# Patient Record
Sex: FEMALE | Race: WHITE | NOT HISPANIC OR LATINO | URBAN - METROPOLITAN AREA
[De-identification: names, ages, dates, MRNs, and addresses within clinical notes are randomized per-mention and may not be internally consistent; named-entity substitution may affect disease eponyms.]

---

## 2021-01-02 ENCOUNTER — ANESTHESIA (INPATIENT)
Dept: OPERATING ROOM | Facility: HOSPITAL | Age: 76
DRG: 418 | End: 2021-01-02
Payer: MEDICARE

## 2021-01-02 ENCOUNTER — HOSPITAL ENCOUNTER (INPATIENT)
Facility: HOSPITAL | Age: 76
LOS: 1 days | Discharge: HOME | DRG: 418 | End: 2021-01-03
Attending: STUDENT IN AN ORGANIZED HEALTH CARE EDUCATION/TRAINING PROGRAM | Admitting: SURGERY
Payer: MEDICARE

## 2021-01-02 ENCOUNTER — ANESTHESIA EVENT (INPATIENT)
Dept: OPERATING ROOM | Facility: HOSPITAL | Age: 76
DRG: 418 | End: 2021-01-02
Payer: MEDICARE

## 2021-01-02 ENCOUNTER — APPOINTMENT (EMERGENCY)
Dept: RADIOLOGY | Facility: HOSPITAL | Age: 76
DRG: 418 | End: 2021-01-02
Attending: STUDENT IN AN ORGANIZED HEALTH CARE EDUCATION/TRAINING PROGRAM
Payer: MEDICARE

## 2021-01-02 ENCOUNTER — APPOINTMENT (EMERGENCY)
Dept: RADIOLOGY | Facility: HOSPITAL | Age: 76
DRG: 418 | End: 2021-01-02
Attending: SURGERY
Payer: MEDICARE

## 2021-01-02 DIAGNOSIS — K81.0 ACUTE CHOLECYSTITIS: Primary | ICD-10-CM

## 2021-01-02 DIAGNOSIS — E87.1 HYPONATREMIA: ICD-10-CM

## 2021-01-02 LAB
ABO + RH BLD: NORMAL
ALBUMIN SERPL-MCNC: 4.5 G/DL (ref 3.4–5)
ALP SERPL-CCNC: 87 IU/L (ref 35–126)
ALT SERPL-CCNC: 25 IU/L (ref 11–54)
ANION GAP SERPL CALC-SCNC: 11 MEQ/L (ref 3–15)
APTT PPP: <22 SEC (ref 23–35)
AST SERPL-CCNC: 40 IU/L (ref 15–41)
ATRIAL RATE: 78
BACTERIA URNS QL MICRO: NORMAL /HPF
BASOPHILS # BLD: 0.04 K/UL (ref 0.01–0.1)
BASOPHILS NFR BLD: 0.3 %
BILIRUB DIRECT SERPL-MCNC: 0.2 MG/DL
BILIRUB SERPL-MCNC: 1 MG/DL (ref 0.3–1.2)
BILIRUB UR QL STRIP.AUTO: NEGATIVE MG/DL
BLD GP AB SCN SERPL QL: NEGATIVE
BUN SERPL-MCNC: 15 MG/DL (ref 8–20)
CALCIUM SERPL-MCNC: 9 MG/DL (ref 8.9–10.3)
CHLORIDE SERPL-SCNC: 92 MEQ/L (ref 98–109)
CLARITY UR REFRACT.AUTO: ABNORMAL
CO2 SERPL-SCNC: 23 MEQ/L (ref 22–32)
COLOR UR AUTO: YELLOW
CREAT SERPL-MCNC: 0.7 MG/DL (ref 0.6–1.1)
D AG BLD QL: POSITIVE
DIFFERENTIAL METHOD BLD: ABNORMAL
EOSINOPHIL # BLD: 0 K/UL (ref 0.04–0.36)
EOSINOPHIL NFR BLD: 0 %
ERYTHROCYTE [DISTWIDTH] IN BLOOD BY AUTOMATED COUNT: 12.2 % (ref 11.7–14.4)
GFR SERPL CREATININE-BSD FRML MDRD: >60 ML/MIN/1.73M*2
GLUCOSE SERPL-MCNC: 154 MG/DL (ref 70–99)
GLUCOSE UR STRIP.AUTO-MCNC: NEGATIVE MG/DL
HCT VFR BLDCO AUTO: 32.6 % (ref 35–45)
HGB BLD-MCNC: 10.7 G/DL (ref 11.8–15.7)
HGB UR QL STRIP.AUTO: NEGATIVE
HYALINE CASTS #/AREA URNS LPF: NORMAL /LPF
IMM GRANULOCYTES # BLD AUTO: 0.09 K/UL (ref 0–0.08)
IMM GRANULOCYTES NFR BLD AUTO: 0.6 %
INR PPP: 1.1 INR
KETONES UR STRIP.AUTO-MCNC: NEGATIVE MG/DL
LABORATORY COMMENT REPORT: NORMAL
LEUKOCYTE ESTERASE UR QL STRIP.AUTO: NEGATIVE
LIPASE SERPL-CCNC: 32 U/L (ref 20–51)
LYMPHOCYTES # BLD: 0.43 K/UL (ref 1.2–3.5)
LYMPHOCYTES NFR BLD: 2.8 %
MCH RBC QN AUTO: 30.9 PG (ref 28–33.2)
MCHC RBC AUTO-ENTMCNC: 32.8 G/DL (ref 32.2–35.5)
MCV RBC AUTO: 94.2 FL (ref 83–98)
MONOCYTES # BLD: 0.81 K/UL (ref 0.28–0.8)
MONOCYTES NFR BLD: 5.3 %
NEUTROPHILS # BLD: 14.01 K/UL (ref 1.7–7)
NEUTS SEG NFR BLD: 91 %
NITRITE UR QL STRIP.AUTO: NEGATIVE
NRBC BLD-RTO: 0 %
P AXIS: 94
PDW BLD AUTO: 9.1 FL (ref 9.4–12.3)
PH UR STRIP.AUTO: 6.5 [PH]
PLATELET # BLD AUTO: 287 K/UL (ref 150–369)
POTASSIUM SERPL-SCNC: 3.4 MEQ/L (ref 3.6–5.1)
PR INTERVAL: 168
PROT SERPL-MCNC: 7.3 G/DL (ref 6–8.2)
PROT UR QL STRIP.AUTO: NEGATIVE
PROTHROMBIN TIME: 13.8 SEC (ref 12.2–14.5)
QRS DURATION: 82
QT INTERVAL: 422
QTC CALCULATION(BAZETT): 481
R AXIS: 76
RBC # BLD AUTO: 3.46 M/UL (ref 3.93–5.22)
RBC #/AREA URNS HPF: NORMAL /HPF
SARS-COV-2 RNA RESP QL NAA+PROBE: NEGATIVE
SODIUM SERPL-SCNC: 126 MEQ/L (ref 136–144)
SP GR UR REFRACT.AUTO: 1.01
SQUAMOUS URNS QL MICRO: NORMAL /HPF
T WAVE AXIS: 75
TROPONIN I SERPL-MCNC: <0.02 NG/ML
UROBILINOGEN UR STRIP-ACNC: 0.2 EU/DL
VENTRICULAR RATE: 78
WBC # BLD AUTO: 15.38 K/UL (ref 3.8–10.5)
WBC #/AREA URNS HPF: NORMAL /HPF

## 2021-01-02 PROCEDURE — 87205 SMEAR GRAM STAIN: CPT | Performed by: SURGERY

## 2021-01-02 PROCEDURE — 0FT44ZZ RESECTION OF GALLBLADDER, PERCUTANEOUS ENDOSCOPIC APPROACH: ICD-10-PCS | Performed by: SURGERY

## 2021-01-02 PROCEDURE — 71045 X-RAY EXAM CHEST 1 VIEW: CPT

## 2021-01-02 PROCEDURE — 99285 EMERGENCY DEPT VISIT HI MDM: CPT | Mod: 25

## 2021-01-02 PROCEDURE — U0002 COVID-19 LAB TEST NON-CDC: HCPCS | Performed by: PHYSICIAN ASSISTANT

## 2021-01-02 PROCEDURE — 85730 THROMBOPLASTIN TIME PARTIAL: CPT | Performed by: STUDENT IN AN ORGANIZED HEALTH CARE EDUCATION/TRAINING PROGRAM

## 2021-01-02 PROCEDURE — 63600000 HC DRUGS/DETAIL CODE: Performed by: ANESTHESIOLOGY

## 2021-01-02 PROCEDURE — BF10YZZ FLUOROSCOPY OF BILE DUCTS USING OTHER CONTRAST: ICD-10-PCS | Performed by: SURGERY

## 2021-01-02 PROCEDURE — 37000001 HC ANESTHESIA GENERAL: Performed by: SURGERY

## 2021-01-02 PROCEDURE — 96365 THER/PROPH/DIAG IV INF INIT: CPT | Mod: 59

## 2021-01-02 PROCEDURE — 93005 ELECTROCARDIOGRAM TRACING: CPT | Performed by: PHYSICIAN ASSISTANT

## 2021-01-02 PROCEDURE — 25000000 HC PHARMACY GENERAL: Performed by: NURSE ANESTHETIST, CERTIFIED REGISTERED

## 2021-01-02 PROCEDURE — 36000004 HC OR LEVEL 4 INITIAL 30MIN: Performed by: SURGERY

## 2021-01-02 PROCEDURE — 80053 COMPREHEN METABOLIC PANEL: CPT | Performed by: PHYSICIAN ASSISTANT

## 2021-01-02 PROCEDURE — 63600000 HC DRUGS/DETAIL CODE: Performed by: NURSE ANESTHETIST, CERTIFIED REGISTERED

## 2021-01-02 PROCEDURE — 25800000 HC PHARMACY IV SOLUTIONS: Performed by: PHYSICIAN ASSISTANT

## 2021-01-02 PROCEDURE — 99999 PR OFFICE/OUTPT VISIT,PROCEDURE ONLY: CPT | Performed by: SURGERY

## 2021-01-02 PROCEDURE — 86900 BLOOD TYPING SEROLOGIC ABO: CPT

## 2021-01-02 PROCEDURE — 47563 LAPARO CHOLECYSTECTOMY/GRAPH: CPT | Performed by: SURGERY

## 2021-01-02 PROCEDURE — 36000014 HC OR LEVEL 4 EA ADDL MIN: Performed by: SURGERY

## 2021-01-02 PROCEDURE — 63600000 HC DRUGS/DETAIL CODE

## 2021-01-02 PROCEDURE — 27200000 HC STERILE SUPPLY: Performed by: SURGERY

## 2021-01-02 PROCEDURE — 25000000 HC PHARMACY GENERAL: Performed by: SURGERY

## 2021-01-02 PROCEDURE — 200200 PR NO CHARGE: Performed by: SURGERY

## 2021-01-02 PROCEDURE — 63700000 HC SELF-ADMINISTRABLE DRUG: Performed by: STUDENT IN AN ORGANIZED HEALTH CARE EDUCATION/TRAINING PROGRAM

## 2021-01-02 PROCEDURE — 25000000 HC PHARMACY GENERAL: Performed by: STUDENT IN AN ORGANIZED HEALTH CARE EDUCATION/TRAINING PROGRAM

## 2021-01-02 PROCEDURE — 27800000 HC SUPPLY/IMPLANTS: Performed by: SURGERY

## 2021-01-02 PROCEDURE — 76705 ECHO EXAM OF ABDOMEN: CPT

## 2021-01-02 PROCEDURE — 99221 1ST HOSP IP/OBS SF/LOW 40: CPT | Mod: 57 | Performed by: SURGERY

## 2021-01-02 PROCEDURE — 81001 URINALYSIS AUTO W/SCOPE: CPT | Performed by: PHYSICIAN ASSISTANT

## 2021-01-02 PROCEDURE — 82248 BILIRUBIN DIRECT: CPT | Performed by: PHYSICIAN ASSISTANT

## 2021-01-02 PROCEDURE — 71000011 HC PACU PHASE 1 EA ADDL MIN: Performed by: SURGERY

## 2021-01-02 PROCEDURE — 36415 COLL VENOUS BLD VENIPUNCTURE: CPT | Performed by: PHYSICIAN ASSISTANT

## 2021-01-02 PROCEDURE — 25800000 HC PHARMACY IV SOLUTIONS: Performed by: STUDENT IN AN ORGANIZED HEALTH CARE EDUCATION/TRAINING PROGRAM

## 2021-01-02 PROCEDURE — 85610 PROTHROMBIN TIME: CPT | Performed by: STUDENT IN AN ORGANIZED HEALTH CARE EDUCATION/TRAINING PROGRAM

## 2021-01-02 PROCEDURE — 83690 ASSAY OF LIPASE: CPT | Performed by: PHYSICIAN ASSISTANT

## 2021-01-02 PROCEDURE — 74300 X-RAY BILE DUCTS/PANCREAS: CPT

## 2021-01-02 PROCEDURE — 63600000 HC DRUGS/DETAIL CODE: Performed by: STUDENT IN AN ORGANIZED HEALTH CARE EDUCATION/TRAINING PROGRAM

## 2021-01-02 PROCEDURE — 85025 COMPLETE CBC W/AUTO DIFF WBC: CPT | Performed by: PHYSICIAN ASSISTANT

## 2021-01-02 PROCEDURE — 84484 ASSAY OF TROPONIN QUANT: CPT | Performed by: PHYSICIAN ASSISTANT

## 2021-01-02 PROCEDURE — 71000001 HC PACU PHASE 1 INITIAL 30MIN: Performed by: SURGERY

## 2021-01-02 PROCEDURE — 88304 TISSUE EXAM BY PATHOLOGIST: CPT | Performed by: SURGERY

## 2021-01-02 PROCEDURE — 12000000 HC ROOM AND CARE MED/SURG

## 2021-01-02 RX ORDER — CEFAZOLIN SODIUM 1 G/50ML
1 SOLUTION INTRAVENOUS
Status: COMPLETED | OUTPATIENT
Start: 2021-01-02 | End: 2021-01-02

## 2021-01-02 RX ORDER — LEVOTHYROXINE SODIUM 100 UG/1
100 TABLET ORAL EVERY OTHER DAY
Status: DISCONTINUED | OUTPATIENT
Start: 2021-01-02 | End: 2021-01-02

## 2021-01-02 RX ORDER — METRONIDAZOLE 500 MG/100ML
500 INJECTION, SOLUTION INTRAVENOUS ONCE AS NEEDED
Status: COMPLETED | OUTPATIENT
Start: 2021-01-02 | End: 2021-01-02

## 2021-01-02 RX ORDER — ROCURONIUM BROMIDE 10 MG/ML
INJECTION, SOLUTION INTRAVENOUS AS NEEDED
Status: DISCONTINUED | OUTPATIENT
Start: 2021-01-02 | End: 2021-01-02 | Stop reason: SURG

## 2021-01-02 RX ORDER — SODIUM CHLORIDE 9 MG/ML
INJECTION, SOLUTION INTRAVENOUS CONTINUOUS
Status: DISCONTINUED | OUTPATIENT
Start: 2021-01-02 | End: 2021-01-02 | Stop reason: HOSPADM

## 2021-01-02 RX ORDER — TRAMADOL HYDROCHLORIDE 50 MG/1
50 TABLET ORAL EVERY 4 HOURS PRN
COMMUNITY
Start: 2020-12-12

## 2021-01-02 RX ORDER — LABETALOL HCL 20 MG/4 ML
5 SYRINGE (ML) INTRAVENOUS AS NEEDED
Status: DISCONTINUED | OUTPATIENT
Start: 2021-01-02 | End: 2021-01-02

## 2021-01-02 RX ORDER — HYDROMORPHONE HYDROCHLORIDE 1 MG/ML
0.5 INJECTION, SOLUTION INTRAMUSCULAR; INTRAVENOUS; SUBCUTANEOUS
Status: DISCONTINUED | OUTPATIENT
Start: 2021-01-02 | End: 2021-01-02

## 2021-01-02 RX ORDER — FENTANYL CITRATE 50 UG/ML
50 INJECTION, SOLUTION INTRAMUSCULAR; INTRAVENOUS
Status: DISCONTINUED | OUTPATIENT
Start: 2021-01-02 | End: 2021-01-02

## 2021-01-02 RX ORDER — ONDANSETRON HYDROCHLORIDE 2 MG/ML
4 INJECTION, SOLUTION INTRAVENOUS
Status: DISCONTINUED | OUTPATIENT
Start: 2021-01-02 | End: 2021-01-02

## 2021-01-02 RX ORDER — KETOROLAC TROMETHAMINE 15 MG/ML
15 INJECTION, SOLUTION INTRAMUSCULAR; INTRAVENOUS ONCE
Status: COMPLETED | OUTPATIENT
Start: 2021-01-02 | End: 2021-01-02

## 2021-01-02 RX ORDER — DOCUSATE SODIUM 100 MG/1
100 CAPSULE, LIQUID FILLED ORAL DAILY
COMMUNITY

## 2021-01-02 RX ORDER — DOCUSATE SODIUM 100 MG/1
100 CAPSULE, LIQUID FILLED ORAL DAILY
Status: DISCONTINUED | OUTPATIENT
Start: 2021-01-02 | End: 2021-01-03 | Stop reason: HOSPADM

## 2021-01-02 RX ORDER — BUPIVACAINE HYDROCHLORIDE 2.5 MG/ML
INJECTION, SOLUTION EPIDURAL; INFILTRATION; INTRACAUDAL AS NEEDED
Status: DISCONTINUED | OUTPATIENT
Start: 2021-01-02 | End: 2021-01-02 | Stop reason: HOSPADM

## 2021-01-02 RX ORDER — IBUPROFEN 200 MG
16-32 TABLET ORAL AS NEEDED
Status: DISCONTINUED | OUTPATIENT
Start: 2021-01-02 | End: 2021-01-03 | Stop reason: HOSPADM

## 2021-01-02 RX ORDER — DEXAMETHASONE SODIUM PHOSPHATE 4 MG/ML
INJECTION, SOLUTION INTRA-ARTICULAR; INTRALESIONAL; INTRAMUSCULAR; INTRAVENOUS; SOFT TISSUE AS NEEDED
Status: DISCONTINUED | OUTPATIENT
Start: 2021-01-02 | End: 2021-01-02 | Stop reason: SURG

## 2021-01-02 RX ORDER — METRONIDAZOLE 500 MG/100ML
INJECTION, SOLUTION INTRAVENOUS
Status: COMPLETED
Start: 2021-01-02 | End: 2021-01-02

## 2021-01-02 RX ORDER — MORPHINE SULFATE 15 MG/1
7.5 TABLET ORAL EVERY 4 HOURS PRN
Status: DISCONTINUED | OUTPATIENT
Start: 2021-01-02 | End: 2021-01-02 | Stop reason: HOSPADM

## 2021-01-02 RX ORDER — TRAMADOL HYDROCHLORIDE 50 MG/1
50 TABLET ORAL EVERY 4 HOURS PRN
Status: DISCONTINUED | OUTPATIENT
Start: 2021-01-02 | End: 2021-01-03 | Stop reason: HOSPADM

## 2021-01-02 RX ORDER — LEVOTHYROXINE SODIUM 112 UG/1
112 TABLET ORAL EVERY OTHER DAY
Status: DISCONTINUED | OUTPATIENT
Start: 2020-12-28 | End: 2021-01-02

## 2021-01-02 RX ORDER — ONDANSETRON HYDROCHLORIDE 2 MG/ML
4 INJECTION, SOLUTION INTRAVENOUS ONCE
Status: DISPENSED | OUTPATIENT
Start: 2021-01-02 | End: 2021-01-02

## 2021-01-02 RX ORDER — ONDANSETRON HYDROCHLORIDE 2 MG/ML
INJECTION, SOLUTION INTRAVENOUS AS NEEDED
Status: DISCONTINUED | OUTPATIENT
Start: 2021-01-02 | End: 2021-01-02 | Stop reason: SURG

## 2021-01-02 RX ORDER — HEPARIN SODIUM 5000 [USP'U]/ML
5000 INJECTION, SOLUTION INTRAVENOUS; SUBCUTANEOUS EVERY 8 HOURS
Status: DISCONTINUED | OUTPATIENT
Start: 2021-01-03 | End: 2021-01-02 | Stop reason: HOSPADM

## 2021-01-02 RX ORDER — KETOROLAC TROMETHAMINE 15 MG/ML
15 INJECTION, SOLUTION INTRAMUSCULAR; INTRAVENOUS EVERY 6 HOURS PRN
Status: DISCONTINUED | OUTPATIENT
Start: 2021-01-02 | End: 2021-01-03 | Stop reason: HOSPADM

## 2021-01-02 RX ORDER — LIDOCAINE HYDROCHLORIDE 10 MG/ML
INJECTION, SOLUTION INFILTRATION; PERINEURAL AS NEEDED
Status: DISCONTINUED | OUTPATIENT
Start: 2021-01-02 | End: 2021-01-02 | Stop reason: SURG

## 2021-01-02 RX ORDER — LEVOTHYROXINE SODIUM 112 UG/1
112 TABLET ORAL EVERY OTHER DAY
Status: DISCONTINUED | OUTPATIENT
Start: 2021-01-02 | End: 2021-01-02

## 2021-01-02 RX ORDER — DEXTROSE 50 % IN WATER (D50W) INTRAVENOUS SYRINGE
25 AS NEEDED
Status: DISCONTINUED | OUTPATIENT
Start: 2021-01-02 | End: 2021-01-03 | Stop reason: HOSPADM

## 2021-01-02 RX ORDER — DEXTROSE 40 %
15-30 GEL (GRAM) ORAL AS NEEDED
Status: DISCONTINUED | OUTPATIENT
Start: 2021-01-02 | End: 2021-01-03 | Stop reason: HOSPADM

## 2021-01-02 RX ORDER — LEVOTHYROXINE SODIUM 100 UG/1
100 TABLET ORAL EVERY OTHER DAY
Status: DISCONTINUED | OUTPATIENT
Start: 2021-01-03 | End: 2021-01-03 | Stop reason: HOSPADM

## 2021-01-02 RX ORDER — KETOROLAC TROMETHAMINE 15 MG/ML
INJECTION, SOLUTION INTRAMUSCULAR; INTRAVENOUS
Status: COMPLETED
Start: 2021-01-02 | End: 2021-01-02

## 2021-01-02 RX ORDER — LEVOTHYROXINE SODIUM 100 UG/1
100 TABLET ORAL EVERY OTHER DAY
Status: DISCONTINUED | OUTPATIENT
Start: 2021-01-04 | End: 2021-01-02

## 2021-01-02 RX ORDER — LEVOTHYROXINE SODIUM 100 UG/1
100 TABLET ORAL EVERY OTHER DAY
COMMUNITY
Start: 2020-11-24

## 2021-01-02 RX ORDER — LEVOTHYROXINE SODIUM 112 UG/1
112 TABLET ORAL EVERY OTHER DAY
Status: DISCONTINUED | OUTPATIENT
Start: 2021-01-04 | End: 2021-01-03 | Stop reason: HOSPADM

## 2021-01-02 RX ORDER — ACETAMINOPHEN 325 MG/1
650 TABLET ORAL EVERY 6 HOURS PRN
Status: DISCONTINUED | OUTPATIENT
Start: 2021-01-02 | End: 2021-01-03 | Stop reason: HOSPADM

## 2021-01-02 RX ORDER — TRAMADOL HYDROCHLORIDE 50 MG/1
25 TABLET ORAL EVERY 4 HOURS PRN
Status: DISCONTINUED | OUTPATIENT
Start: 2021-01-02 | End: 2021-01-02

## 2021-01-02 RX ORDER — HYDROMORPHONE HYDROCHLORIDE 1 MG/ML
0.5 INJECTION, SOLUTION INTRAMUSCULAR; INTRAVENOUS; SUBCUTANEOUS ONCE
Status: DISCONTINUED | OUTPATIENT
Start: 2021-01-02 | End: 2021-01-02

## 2021-01-02 RX ORDER — KETOROLAC TROMETHAMINE 30 MG/ML
INJECTION, SOLUTION INTRAMUSCULAR; INTRAVENOUS AS NEEDED
Status: DISCONTINUED | OUTPATIENT
Start: 2021-01-02 | End: 2021-01-02 | Stop reason: SURG

## 2021-01-02 RX ORDER — PROPOFOL 10 MG/ML
INJECTION, EMULSION INTRAVENOUS AS NEEDED
Status: DISCONTINUED | OUTPATIENT
Start: 2021-01-02 | End: 2021-01-02 | Stop reason: SURG

## 2021-01-02 RX ORDER — TRAMADOL HYDROCHLORIDE 50 MG/1
50 TABLET ORAL EVERY 4 HOURS PRN
Status: DISCONTINUED | OUTPATIENT
Start: 2021-01-02 | End: 2021-01-02

## 2021-01-02 RX ORDER — LEVOTHYROXINE SODIUM 112 UG/1
112 TABLET ORAL EVERY OTHER DAY
Status: DISCONTINUED | OUTPATIENT
Start: 2021-01-03 | End: 2021-01-02

## 2021-01-02 RX ORDER — FENTANYL CITRATE 50 UG/ML
INJECTION, SOLUTION INTRAMUSCULAR; INTRAVENOUS AS NEEDED
Status: DISCONTINUED | OUTPATIENT
Start: 2021-01-02 | End: 2021-01-02 | Stop reason: SURG

## 2021-01-02 RX ORDER — LEVOTHYROXINE SODIUM 112 UG/1
112 TABLET ORAL EVERY OTHER DAY
COMMUNITY
Start: 2020-12-07

## 2021-01-02 RX ADMIN — SUGAMMADEX 118.8 MG: 100 INJECTION, SOLUTION INTRAVENOUS at 13:10

## 2021-01-02 RX ADMIN — CEFAZOLIN SODIUM 1 G: 1 SOLUTION INTRAVENOUS at 09:46

## 2021-01-02 RX ADMIN — ONDANSETRON 4 MG: 2 INJECTION INTRAMUSCULAR; INTRAVENOUS at 12:46

## 2021-01-02 RX ADMIN — KETOROLAC TROMETHAMINE 15 MG: 30 INJECTION, SOLUTION INTRAMUSCULAR at 13:00

## 2021-01-02 RX ADMIN — ACETAMINOPHEN 650 MG: 325 TABLET, FILM COATED ORAL at 15:22

## 2021-01-02 RX ADMIN — FENTANYL CITRATE 50 MCG: 50 INJECTION, SOLUTION INTRAMUSCULAR; INTRAVENOUS at 14:11

## 2021-01-02 RX ADMIN — KETOROLAC TROMETHAMINE 15 MG: 15 INJECTION, SOLUTION INTRAMUSCULAR; INTRAVENOUS at 05:52

## 2021-01-02 RX ADMIN — FENTANYL CITRATE 50 MCG: 50 INJECTION, SOLUTION INTRAMUSCULAR; INTRAVENOUS at 14:23

## 2021-01-02 RX ADMIN — TRAMADOL HYDROCHLORIDE 25 MG: 50 TABLET, FILM COATED ORAL at 21:47

## 2021-01-02 RX ADMIN — PROPOFOL 150 MG: 10 INJECTION, EMULSION INTRAVENOUS at 11:09

## 2021-01-02 RX ADMIN — LIDOCAINE HYDROCHLORIDE 5 ML: 10 INJECTION, SOLUTION INFILTRATION; PERINEURAL at 11:09

## 2021-01-02 RX ADMIN — ROCURONIUM BROMIDE 10 MG: 10 INJECTION, SOLUTION INTRAVENOUS at 12:37

## 2021-01-02 RX ADMIN — FENTANYL CITRATE 50 MCG: 50 INJECTION, SOLUTION INTRAMUSCULAR; INTRAVENOUS at 11:09

## 2021-01-02 RX ADMIN — SODIUM CHLORIDE: 9 INJECTION, SOLUTION INTRAVENOUS at 09:46

## 2021-01-02 RX ADMIN — METRONIDAZOLE 500 MG: 500 INJECTION, SOLUTION INTRAVENOUS at 10:55

## 2021-01-02 RX ADMIN — ROCURONIUM BROMIDE 50 MG: 10 INJECTION, SOLUTION INTRAVENOUS at 11:09

## 2021-01-02 RX ADMIN — DEXAMETHASONE SODIUM PHOSPHATE 4 MG: 4 INJECTION, SOLUTION INTRA-ARTICULAR; INTRALESIONAL; INTRAMUSCULAR; INTRAVENOUS; SOFT TISSUE at 11:34

## 2021-01-02 RX ADMIN — DOCUSATE SODIUM 100 MG: 100 CAPSULE, LIQUID FILLED ORAL at 15:22

## 2021-01-02 RX ADMIN — TRAMADOL HYDROCHLORIDE 25 MG: 50 TABLET, FILM COATED ORAL at 16:11

## 2021-01-02 RX ADMIN — SODIUM CHLORIDE 500 ML: 9 INJECTION, SOLUTION INTRAVENOUS at 05:53

## 2021-01-02 RX ADMIN — FENTANYL CITRATE 50 MCG: 50 INJECTION, SOLUTION INTRAMUSCULAR; INTRAVENOUS at 11:25

## 2021-01-02 RX ADMIN — ACETAMINOPHEN 650 MG: 325 TABLET, FILM COATED ORAL at 21:47

## 2021-01-02 SDOH — HEALTH STABILITY: MENTAL HEALTH: HOW OFTEN DO YOU HAVE A DRINK CONTAINING ALCOHOL?: MONTHLY OR LESS

## 2021-01-02 ASSESSMENT — ENCOUNTER SYMPTOMS
DIZZINESS: 0
FEVER: 0
COLOR CHANGE: 0
DIFFICULTY URINATING: 0
SHORTNESS OF BREATH: 0
ABDOMINAL PAIN: 1
VOMITING: 1
DYSURIA: 0
BLOOD IN STOOL: 0
DIARRHEA: 0
CHILLS: 1
WEAKNESS: 0
LIGHT-HEADEDNESS: 0
NAUSEA: 0
COUGH: 0
CONSTIPATION: 1

## 2021-01-02 ASSESSMENT — PAIN SCALES - GENERAL: PAIN_LEVEL: 4

## 2021-01-02 NOTE — ANESTHESIOLOGIST PRE-PROCEDURE ATTESTATION
Pre-Procedure Patient Identification:  I am the Primary Anesthesiologist and have identified the patient on 01/02/21 at 10:49 AM.   I have confirmed the following procedure(s) CHOLECYSTECTOMY LAPAROSCOPIC WITH INTRAOPERATIVE CHOLANGIOGRAM POSSIBLE OPEN CHOLECYSTECTOMY will be performed by the following surgeon/proceduralist Rashad Luna MD.

## 2021-01-02 NOTE — OR SURGEON
The patient was correctly identified and the procedure was verified with the patient and nurse in pre-op holding by myself personally.  All questions answered.  The procedure was explained in detail.  Risks and complications discussed again.  The patient wished to proceed.    Rashad Luna MD

## 2021-01-02 NOTE — ANESTHESIA POSTPROCEDURE EVALUATION
Patient: Kina Jesus    Procedure Summary     Date: 01/02/21 Room / Location: [facility] OR  / [facility] OR [facility]    Anesthesia Start: 1103 Anesthesia Stop: 1327    Procedure: CHOLECYSTECTOMY LAPAROSCOPIC WITH INTRAOPERATIVE CHOLANGIOGRAM (N/A Abdomen) Diagnosis:       Acute cholecystitis      (Acute cholecystitis [K81.0])    Surgeon: Rashad Luna MD Responsible Provider: Irving Ya MD    Anesthesia Type: general ASA Status: 2          Anesthesia Type: general  PACU Vitals  1/2/2021 1321 - 1/2/2021 1339      1/2/2021  1325 1/2/2021  1330          BP:  (!) 128/102  (!) 159/72      Temp:  37.3 °C (99.2 °F)  --      Pulse:  92  83      Resp:  15  (!) 76      SpO2:  99 %  100 %              Anesthesia Post Evaluation    Pain score: 4  Pain management: satisfactory to patient  Mode of pain management: IV medication  Patient location during evaluation: PACU  Patient participation: complete - patient participated  Level of consciousness: awake  Cardiovascular status: acceptable  Airway Patency: adequate  Respiratory status: acceptable  Hydration status: stable  Anesthetic complications: no

## 2021-01-02 NOTE — ED ATTESTATION NOTE
I personally saw and evaluated the patient, participated in the management, and agree with the findings in the note above except as where stated. The physician assistant and I discussed the case, workup, and disposition.     75-year-old female with past medical history significant for hyperlipidemia, hypothyroidism, appendectomy presenting secondary to abdominal pain.  Patient reports that Thursday night she developed diffuse upper abdominal pain that resolved and she was feeling well on Friday.  Patient reports that she tolerated lunch without any difficulty.  Friday evening she developed recurrence of abdominal pain that has been constant 10 out of 10 since then without any exacerbate alleviating factors.  She is unable to characterize pain.  She reports that she has had similar pain in the past to her left upper quadrant since March for which she has had multiple work-ups including endoscopy and CT scans which came back negative.  She was told that she may need to see a surgeon her gallbladder removed.  She was also told that she has a cystocele and a rectocele.  She reports that pain became so severe tonight that she had one episode of emesis; however, she denies vomiting.  She otherwise denies fevers, chest pain, difficulty breathing, nausea, dysuria, diarrhea or hematochezia.    Physical exam she is awake alert resting comfortably no acute distress  Heart is regular rate rhythm normal S1-S2 no murmurs rubs or gallops  Lungs are clear to auscultation bilaterally  She has normoactive bowel sounds, abdomen is soft with left lower quadrant and right upper quadrant tenderness with negative Rowe sign, no guarding or rebound  There is left CVA tenderness    75-year-old female presenting secondary to intractable abdominal pain.  Will obtain labs and begin with ultrasound of gallbladder for evaluation.  We will proceed to CT abdomen and pelvis if ultrasound unremarkable.     Akash Hernandez MD  01/02/21 [id number]

## 2021-01-02 NOTE — ANESTHESIA PREPROCEDURE EVALUATION
Relevant Problems   No relevant active problems       Anesthesia ROS/MED HX    Anesthesia History - neg  Pulmonary - neg  Neuro/Psych - neg  Cardiovascular- neg   Covid19 Test Reviewed and ECG reviewed  Abnormal ECG      Hematological - neg  GI/Hepatic   GERD  Musculoskeletal- neg  Renal Disease- neg  Endo/Other   Hypothyroidism       Past Surgical History:   Procedure Laterality Date   • APPENDECTOMY     • JOINT REPLACEMENT     • ROTATOR CUFF REPAIR     • TONSILLECTOMY         Physical Exam    Airway   Mallampati: III   TM distance: >3 FB   Neck ROM: full  Cardiovascular - normal   Rhythm: regular   Rate: normalPulmonary - normal   clear to auscultation  Dental           Anesthesia Plan    Plan: general    Technique: general endotracheal     Lines and Monitors: PIV     Airway: oral intubation and video laryngoscope   ASA 2  Anesthetic plan and risks discussed with: patient  Induction:    intravenous

## 2021-01-02 NOTE — NURSING NOTE
Pt arrived from PACU, reporting 10/10 L leg pain. Pain relieved when I took off sequential BRIAN on L leg. No unilateral leg swelling, pain unchanged with flexing and extending the foot, positive pulses in both dorsalis pedis pulses, warm to touch on both legs and feet, positive capillary refill. Dr. Shen notified and aware, per Dr. Shen, keep sequential BRIAN on R leg. Will continue to monitor. 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

## 2021-01-02 NOTE — ANESTHESIA PROCEDURE NOTES
Airway  Urgency: elective    Start Time: 1/2/2021 11:11 AM  Airway not difficult    General Information and Staff    Patient location during procedure: OR  Anesthesiologist: Irving Ya MD  Resident/CRNA: Homa Uriostegui CRNA  Performed: resident/CRNA     Indications and Patient Condition  Indications for airway management: anesthesia  Sedation level: deep  Preoxygenated: yes  Patient position: sniffing  MILS not maintained throughout  Mask difficulty assessment: 1 - vent by mask    Final Airway Details  Final airway type: endotracheal airway      Successful airway: ETT    Successful intubation technique: video laryngoscopy  Facilitating devices/methods: intubating stylet  Endotracheal tube insertion site: oral  Blade: Arnold  Blade size: #3  ETT size (mm): 7.0  Cormack-Lehane Classification: grade I - full view of glottis  Placement verified by: chest auscultation, capnometry and palpation of cuff   Measured from: lips  ETT to lips (cm): 22  Number of attempts at approach: 1  Ventilation between attempts: none  Number of other approaches attempted: 0  Atraumatic airway insertion

## 2021-01-02 NOTE — H&P
General Surgery Consult     Subjective      Kinalucy Jesus is a 75 y.o. female who was admitted for No admission diagnoses are documented for this encounter.. Patient was seen in consultation at the request of referring physician for management recommendations.      Patient is a 75 y.o. female with PMH HLD, rectovaginocele, hypothyroidism on synthroid p/w 2 days diffuse upper abdominal pain (R>L) initially resolved early yesterday though recurred yesterday more severe in the evening prompting the ED visit. Her symptoms were associated with chills from the pain, 1 episode of nausea and large volume emesis of oatmeal she ate. Of note, she had similar symptoms in Mach 2019 which prompted several work up with GI and [facility] though all have not been revealing and her sxs have not recurred until now. This time, her sxs started after she had avocado. Colonoscopy 3 years ago was clear. She otherwise denies f/cp/sob/cough/covid-19 exposure. She has regular BMs 3x daily with miralax which she has been taking for the past year, initially initiated for constipation.      In the ED, she AFVSS, WBC 15, LFTs wnl. RUQ US performed  Prelim read:  5mm GB wall, mild pericholecystic fluid, + sonographic sawyer's sign.     PMH: HLD, rectovaginocele, hypothyroidism  PSH: appy, DREW, BSO   Meds: Synthroid, miralax   All: statins, ezetimibe, niacin, colesevalam        Medical History:   Medical History        Past Medical History:   Diagnosis Date   • Acid reflux     • Hypothyroidism             Surgical History:   Surgical History         Past Surgical History:   Procedure Laterality Date   • APPENDECTOMY       • JOINT REPLACEMENT       • ROTATOR CUFF REPAIR       • TONSILLECTOMY               Social History:   Social History          Social History Narrative   • Not on file         Family History: History reviewed. No pertinent family history.     Allergies: Colesevelam, Ezetimibe, Niacin, Oxycodone, and Statins-hmg-coa reductase  "inhibitors     Home Medications:  Not in a hospital admission.     Current Medications:  •  ondansetron, 4 mg, intravenous, Once  •  sodium chloride, 500 mL, intravenous, Once  •  docusate sodium  •  levothyroxine  •  traMADoL     Review of Systems  Pertinent items are noted in HPI.     Objective      Physicial Exam  Visit Vitals  /63   Pulse 75   Temp 36.9 °C (98.5 °F)   Resp 18   Ht 1.753 m (5' 9\")   Wt 59.4 kg (131 lb)   SpO2 100%   BMI 19.35 kg/m²         General appearance: alert, appears stated age and cooperative  Head: normocephalic, without obvious abnormality, atraumatic  Eyes: conjunctivae/corneas clear. PERRL, EOM's intact. Fundi benign.  Lungs: clear to auscultation bilaterally  Chest wall: no tenderness  Heart: regular rate and rhythm, S1, S2 normal, no murmur, click, rub or gallop  Abdomen: +sawyer's sign, RUQ tenderness, no guarding or rebound. Otherwise Soft, nondistended  Neurologic: Grossly normal        Labs  No new labs.  Imaging  I have reviewed the Imaging from the last 24 hrs.  MRI SHOULDER RIGHT WITHOUT CONTRAST  Mar 17 2016 12:03PM  - R MRI SHOULDER WITHOUT CONTRAST  CLINICAL HISTORY:Right shoulder pain.  COMMENT:  Comparison: Right shoulder MRI performed on 1/25/2013 at [facility].  Procedure: Multiplanar, multisequence images of the left shoulder were  performed on a 1.5 Sunshine magnet. No contrast was given.  ACROMION AND ACROMIOCLAVICULAR JOINT: Preop moderate acromioclavicular  degenerative changes.  Moderate subacromial spur.  ROTATOR CUFF: Supraspinatus and infraspinatus tendinosis.  New  supraspinatus tendon small,, thin, high-grade, articular surface tear at  the tendon footprint.  Posterior infraspinatus tendon moderate grade,  articular surface, partial tear, minimally changed from the prior study.  BICEPS LONG HEAD: Long head of the biceps tendon is intact and normally  situated within the bicipital groove.  MUSCLES: Possible minimal infraspinatus muscle atrophy.  " Normal muscle  signal intensity.  SUBACROMIAL-SUBDELTOID BURSA: Moderate amount of bursal fluid.  GLENOHUMERAL JOINT: Small joint effusion.  Glenohumeral alignment is  normal.  Fissures within the posterior glenoid articular cartilage.  Small marginal osteophytes.  LABRUM: No evidence of a paralabral cyst.  OTHER: No evidence of an acute or healing fracture. No mass or  mass-effect within the quadrilateral space, suprascapular notch or  spinoglenoid notch.  Preservation of the fat within the rotator interval.  IMPRESSION:  1.  Supraspinatus and infraspinatus tendinosis with a new articular  surface supraspinatus tendon partial tear.  Unchanged infraspinatus  tendon partial tear.  2.  Subacromial/subdeltoid bursitis.  3.  Mild glenohumeral degenerative changes.  Transcribed by: n/a :Mar 17 2016  3:43P  Dictated by: DONNA ZHOU MD:Mar 17 2016  3:43P  Dictation signed by:DONNA ZHOU MD:Mar 17 2016  3:55P  CPT Code: 32782  Ultrasound venous leg left  ULTRASOUND - Jun 11 2014  2:07PM  - L US LOWER EXTREMITY VEINS UNILAT  CLINICAL HISTORY: Left lower extremity pain.  COMMENT: Ultrasound examination of the left lower extremity venous system  is performed utilizing gray scale, color, and pulsed Doppler sonography.  There are no prior examinations available for comparison.  There is normal response to compression within the left common femoral,  superficial femoral, and popliteal veins. Normal color-flow, venous  waveforms, and response to augmentation is identified.  The peroneal and  posterior tibial vein were also examined in the calf and where visualized  are free of thrombus.  IMPRESSION:  No evidence for left - sided deep venous thrombosis.  Transcribed by:              n/a : Jun 11 2014  2:07P  Dictated by:                    REGAN MARQUEZ M.D.:   Jun 11 2014  2:07P  Dictation signed by:        REGAN MARQUEZ M.D.:   Jun 11 2014  2:08P  CPT Code: 53619              Assessment      The patient is a 75 y.o.  female PMH HLD, rectovaginocele, hypothyroidism on synthroid p/w acute onset RUQ abdominal pain. Workup c/f acute cholecystitis     Plan      - NPO, IVF  - will discuss surgical intervention     Dallin Sumner MD              I have personally reviewed the patient's radiographs, including CT of the abdomen and pelvis. I agree with the findings listed above.  I have personally reviewed the patient's laboratory studies listed above.  I have discussed the case with the resident including the findings I have elicited as well as the physical findings I Have identified and listed these in the appropriate section above.  I have discussed the case and details of the patient's current illness with the patient's attending physician and have documented this in the history of present illness.  I have discussed and agree with the diagnostic interpretations of the patient with the radiologist. I have discussed the case and advise specific testing and studies with the ED physician including the specific clinical historical and physical findings the identified and have included these findings in the appropriate section above.  I have reviewed the old records and charts.  I have obtained history from the family, including detailed information regarding the severity frequency and timing of the patient's symptoms, as well as how long the symptoms have been present. They also contributed information regarding with the patient tried to do to make the current problem better and observations about what made it worse. This corroborated the patient's history in the history of present illness.  I have found these patients symptoms and problems to be complex and severe in nature requiring an urgent evaluation.  This patient has a new problem and I have personally requested additional workup.  I have specifically ordered parenteral controlled substances for this patient's severity of illness and has required to treat this patient's  symptom complex.    I saw and evaluated the patient.  I discussed the case with the Resident and agree with the findings and plan as documented in the note except for my comments below or within the additional notes today.      Rashad Luna MD    I have thoroughly explained the procedure, alternatives and benefits to the patient and their available family. The risks and complications were also discussed including but not limited to the following: Bleeding, infection, abscess, perforation or leak (from other structures such as bile ducts, bowel, stomach, esophagus, pancreas, ureter, bladder, kidney), fistula (which is an abnormal communication between 2 structures or the skin), injury to other organs or structures both during and as a result of the surgery or dissection or inflammation, retained stones, pancreatitis, hernias, adhesions, obstructions, recurrence, possible open surgery, possible colostomy, nerve damage, wound healing problems, heart attacks, strokes, blood clots in the veins of the body or clots that migrate to the lungs, dehiscence, pneumonias and other unforeseen complications.  While it is not possible to list every and all potentially foreseeable complication, I have done my best to explain and described to a reasonable degree the most likely risks and potential problems as a result of surgery. I have explained that surgery is not an exact science but an heart and that this operation is performed on a human being made-up of living, breathing tissue and that each persons body is unique and responds to surgery and a different way that is not always predictable. I have spent a great deal of time at the patient's bedside answering many questions that were asked, given much advice about both expected and unexpected results after the surgery. All questions were answered and the patient was satisfied at the end of the discussion. I have told the family that I will continue to be in regular  communication with them after the surgery has been completed in accordance with HIPPAA.    Plan Lap Cholecystectomy IOC and all indicated procedures    .Rashad Luna MD, FACS, [facility]

## 2021-01-02 NOTE — NURSING NOTE
Received report from Madalyn in PACU, patient coming up shortly. Saw cardiac monitor order for patient, but med-surg level of care ordered, confirmed with surgery team, no cardiac monitor needed for patient on floor.

## 2021-01-02 NOTE — OP NOTE
PREOPERATIVE DIAGNOSIS: acute calculous cholecystitis    POSTOPERATIVE DIAGNOSIS: same    PROCEDURE: Laparoscopic Cholecystectomy. Intraop-Choleangiogram     SURGEON: Rashad Luna MD, FACS, [facility]     ASSISTANT: Mayuri Shen PGY 3    ANESTHESIA: General oral endotracheal.     SPECIMENS: Gallbladder.     DRAINS: none    ESTIMATED BLOOD LOSS: 50 cc    FINDINGS: inflammed GB    PROCEDURE:     The patient was correctly identified and was given preoperative IV antibiotics, preoperative sequential compression devices and was brought to the operating suite, placed on the operating table in the supine position. Following induction of general anesthesia, the abdomen was prepped and draped in the usual sterile fashion using alcohol and chlorhexidine prep solution. An orogastric tube was placed. The patient voided prior to the procedure. A universal timeout was performed prior to the onset of the procedure.     An incision was made below the umbilicus with retraction of the anterior abdominal wall superiorly and passage of a Veress needle into the peritoneal cavity without difficulty. The abdomen was insufflated to a pressure of 15 mmHg using carbon dioxide. The Veress needle was then withdrawn, and a 5-mm trocar was passed into the peritoneal cavity through the subumbilical position. A 5-mm 30-degree laparoscope was then introduced into the abdominal cavity. There was no evidence of intra-abdominal injury, bleeding or pathology. A 12-mm trocar was inserted in the sub-xiphoid position. A 5-mm trocar was inserted in the right upper quadrant and right flank.     The gallbladder was examined, and the appearance of the gallbladder is described above in the findings. The gallbladder contents were aspirated using the aspiration needle and withdrawn. The gallbladder was retracted superiorly. The infundibulum of the gallbladder was retracted laterally. This was done to expose the triangle of Calot. The cystic duct and the cystic  artery were dissected free. The cystic artery was clipped 3 times proximally and once distally. The cystic duct was clipped once distally and partially transected. A cholangiogram catheter was introduced into the abdominal cavity and passed into the cystic duct and secured in place. A cholangiogram was obtained, which showed no evidence of filling defects, no common bile duct stones, and no common bile duct injury. The cholangiogram catheter was then removed. The cystic duct was then clipped 3 times proximally and transected.     The cystic artery was transected. The gallbladder was then dissected in antegrade fashion using electrocautery. The gallbladder was placed in the Endo Catch bag and brought out through the 12-mm trocar site without difficulty. Hemostasis was obtained in the liver bed using electrocautery. The fascia at the 12-mm trocar site was closed using 0 Vicryl suture in a simple fashion. The liver bed and right upper quadrant was irrigated using normal saline until clear.     There was no evidence of bleeding at the termination of the procedure. The trocars were removed under direct vision with no evidence of herniation or bleeding. The CO2 was evacuated with suction. The trocars were removed under direct vision. There were no other hernias.    The skin was infiltrated with 0.25% Marcaine and closed using 4-0 Monocryl running subcuticular stitch. The wounds were covered with a dry sterile dressing. All needle, sponge, and instrument counts were correct x3.       Rashad Luna MD, FACS, [facility]

## 2021-01-02 NOTE — CONSULTS
General Surgery Consult    Subjective     Kinalucy Jesus is a 75 y.o. female who was admitted for No admission diagnoses are documented for this encounter.. Patient was seen in consultation at the request of referring physician for management recommendations.     Patient is a 75 y.o. female with PMH HLD, rectovaginocele, hypothyroidism on synthroid p/w 2 days diffuse upper abdominal pain (R>L) initially resolved early yesterday though recurred yesterday more severe in the evening prompting the ED visit. Her symptoms were associated with chills from the pain, 1 episode of nausea and large volume emesis of oatmeal she ate. Of note, she had similar symptoms in Mach 2019 which prompted several work up with GI and [facility] though all have not been revealing and her sxs have not recurred until now. This time, her sxs started after she had avocado. Colonoscopy 3 years ago was clear. She otherwise denies f/cp/sob/cough/covid-19 exposure. She has regular BMs 3x daily with miralax which she has been taking for the past year, initially initiated for constipation.     In the ED, she AFVSS, WBC 15, LFTs wnl. RUQ US performed  Prelim read:  5mm GB wall, mild pericholecystic fluid, + sonographic sawyer's sign.    PMH: HLD, rectovaginocele, hypothyroidism  PSH: appy, DREW, BSO   Meds: Synthroid, miralax   All: statins, ezetimibe, niacin, colesevalam      Medical History:   Past Medical History:   Diagnosis Date   • Acid reflux    • Hypothyroidism        Surgical History:   Past Surgical History:   Procedure Laterality Date   • APPENDECTOMY     • JOINT REPLACEMENT     • ROTATOR CUFF REPAIR     • TONSILLECTOMY         Social History:   Social History     Social History Narrative   • Not on file       Family History: History reviewed. No pertinent family history.    Allergies: Colesevelam, Ezetimibe, Niacin, Oxycodone, and Statins-hmg-coa reductase inhibitors    Home Medications:  Not in a hospital admission.    Current Medications:  •   "ondansetron, 4 mg, intravenous, Once  •  sodium chloride, 500 mL, intravenous, Once  •  docusate sodium  •  levothyroxine  •  traMADoL    Review of Systems  Pertinent items are noted in HPI.    Objective     Physicial Exam  Visit Vitals  /63   Pulse 75   Temp 36.9 °C (98.5 °F)   Resp 18   Ht 1.753 m (5' 9\")   Wt 59.4 kg (131 lb)   SpO2 100%   BMI 19.35 kg/m²       General appearance: alert, appears stated age and cooperative  Head: normocephalic, without obvious abnormality, atraumatic  Eyes: conjunctivae/corneas clear. PERRL, EOM's intact. Fundi benign.  Lungs: clear to auscultation bilaterally  Chest wall: no tenderness  Heart: regular rate and rhythm, S1, S2 normal, no murmur, click, rub or gallop  Abdomen: +sawyer's sign, RUQ tenderness, no guarding or rebound. Otherwise Soft, nondistended  Neurologic: Grossly normal      Labs  No new labs.  Imaging  I have reviewed the Imaging from the last 24 hrs.  MRI SHOULDER RIGHT WITHOUT CONTRAST  Mar 17 2016 12:03PM  - R MRI SHOULDER WITHOUT CONTRAST  CLINICAL HISTORY:Right shoulder pain.  COMMENT:  Comparison: Right shoulder MRI performed on 1/25/2013 at [facility].  Procedure: Multiplanar, multisequence images of the left shoulder were  performed on a 1.5 Sunshine magnet. No contrast was given.  ACROMION AND ACROMIOCLAVICULAR JOINT: Preop moderate acromioclavicular  degenerative changes.  Moderate subacromial spur.  ROTATOR CUFF: Supraspinatus and infraspinatus tendinosis.  New  supraspinatus tendon small,, thin, high-grade, articular surface tear at  the tendon footprint.  Posterior infraspinatus tendon moderate grade,  articular surface, partial tear, minimally changed from the prior study.  BICEPS LONG HEAD: Long head of the biceps tendon is intact and normally  situated within the bicipital groove.  MUSCLES: Possible minimal infraspinatus muscle atrophy.  Normal muscle  signal intensity.  SUBACROMIAL-SUBDELTOID BURSA: Moderate amount of bursal " fluid.  GLENOHUMERAL JOINT: Small joint effusion.  Glenohumeral alignment is  normal.  Fissures within the posterior glenoid articular cartilage.  Small marginal osteophytes.  LABRUM: No evidence of a paralabral cyst.  OTHER: No evidence of an acute or healing fracture. No mass or  mass-effect within the quadrilateral space, suprascapular notch or  spinoglenoid notch.  Preservation of the fat within the rotator interval.  IMPRESSION:  1.  Supraspinatus and infraspinatus tendinosis with a new articular  surface supraspinatus tendon partial tear.  Unchanged infraspinatus  tendon partial tear.  2.  Subacromial/subdeltoid bursitis.  3.  Mild glenohumeral degenerative changes.  Transcribed by: n/a :Mar 17 2016  3:43P  Dictated by: DONNA ZHOU MD:Mar 17 2016  3:43P  Dictation signed by:DONNA ZHOU MD:Mar 17 2016  3:55P  CPT Code: 14958  Ultrasound venous leg left  ULTRASOUND - Jun 11 2014  2:07PM  - L US LOWER EXTREMITY VEINS UNILAT  CLINICAL HISTORY: Left lower extremity pain.  COMMENT: Ultrasound examination of the left lower extremity venous system  is performed utilizing gray scale, color, and pulsed Doppler sonography.  There are no prior examinations available for comparison.  There is normal response to compression within the left common femoral,  superficial femoral, and popliteal veins. Normal color-flow, venous  waveforms, and response to augmentation is identified.  The peroneal and  posterior tibial vein were also examined in the calf and where visualized  are free of thrombus.  IMPRESSION:  No evidence for left - sided deep venous thrombosis.  Transcribed by:              n/a : Jun 11 2014  2:07P  Dictated by:                    REGAN MARQUEZ M.D.:   Jun 11 2014  2:07P  Dictation signed by:        REGAN MARQUEZ M.D.:   Jun 11 2014  2:08P  CPT Code: 00131          Assessment     The patient is a 75 y.o. female PMH HLD, rectovaginocele, hypothyroidism on synthroid p/w acute onset RUQ abdominal pain.  Workup c/f acute cholecystitis        Plan     - NPO, IVF  - will discuss surgical intervention    Dallin Sumner MD

## 2021-01-02 NOTE — ED PROVIDER NOTES
HPI     Chief Complaint   Patient presents with   • Abdominal Pain     Pt states that she developed abd pain yesterday.        75-year-old female with history of hyperlipidemia, hypothyroidism presents for evaluation of abdominal pain.  Patient developed abdominal pain on Wednesday evening but subsided by the morning breakfast difficulty, approximately 30 minutes after eating lunch she developed severe right-sided abdominal pain.  She rates the pain 10/10 and notes that it is so severe that she vomited her breakfast, denies hematemesis or bile.  Pain wraps around into the left upper quadrant and around her back as well.  Denies radiation into her chest, fever, shortness of breath, cough or urinary symptoms.  History of chronic constipation, for which he takes MiraLAX and Colace, no changes in bowel movements over the past 2 days.  She reports history of similar, more mild left upper quadrant abdominal pain since March, which was evaluated by GI with endoscopy and CT scans.  She was told she may want to see a surgeon about her gallbladder although she has never been diagnosed with any gallbladder abnormalities.    Abdominal surgeries include appendectomy.      History provided by:  Patient       Patient History     Past Medical History:   Diagnosis Date   • Acid reflux    • Hypothyroidism        Past Surgical History:   Procedure Laterality Date   • APPENDECTOMY     • JOINT REPLACEMENT     • ROTATOR CUFF REPAIR     • TONSILLECTOMY         History reviewed. No pertinent family history.    Social History     Tobacco Use   • Smoking status: Former Smoker     Years: 50.00   • Smokeless tobacco: Never Used   Substance Use Topics   • Alcohol use: Yes     Frequency: Monthly or less   • Drug use: Never       Systems Reviewed from Nursing Triage:  Tobacco  Allergies  Meds  Med Hx  Surg Hx  Fam Hx          Review of Systems     Review of Systems   Constitutional: Positive for chills. Negative for fever.   Respiratory:  Negative for cough and shortness of breath.    Cardiovascular: Negative for chest pain.   Gastrointestinal: Positive for abdominal pain, constipation and vomiting. Negative for blood in stool, diarrhea and nausea.   Genitourinary: Negative for difficulty urinating and dysuria.   Skin: Negative for color change.   Allergic/Immunologic: Negative for immunocompromised state.   Neurological: Negative for dizziness, weakness and light-headedness.   All other systems reviewed and are negative.       Physical Exam     ED Vitals    Date/Time Temp Pulse Resp BP SpO2 [facility]   01/02/21 0956 -- 83 18 140/58 100 % KCP   01/02/21 0722 -- 80 18 139/64 100 % KCP   01/02/21 0517 36.9 °C (98.5 °F) 75 18 140/63 100 % ANTHONY          Pulse Ox %: 100 % (01/02/21 0517)  Pulse Ox Interpretation: Normal (01/02/21 0517)  Heart Rate: 75 (01/02/21 0517)  Rhythm Strip Interpretation: Normal Sinus Rhythm (01/02/21 0517)                                       Physical Exam  Vitals signs and nursing note reviewed.   Constitutional:       Appearance: Normal appearance. She is well-developed.      Comments: Appearing uncomfortable but non-toxic   HENT:      Head: Normocephalic and atraumatic.   Eyes:      Conjunctiva/sclera: Conjunctivae normal.   Cardiovascular:      Rate and Rhythm: Normal rate and regular rhythm.      Heart sounds: Normal heart sounds.   Pulmonary:      Effort: Pulmonary effort is normal.      Breath sounds: Normal breath sounds.   Abdominal:      General: Abdomen is flat. Bowel sounds are normal.      Palpations: Abdomen is soft.      Tenderness: There is abdominal tenderness (mild LUQ and RLQ tenderness, moderate RUQ). There is left CVA tenderness and guarding. There is no right CVA tenderness. Negative signs include Rowe's sign.      Hernia: No hernia is present.      Comments: Hepatomegaly    Musculoskeletal: Normal range of motion.   Skin:     General: Skin is warm and dry.   Neurological:      General: No focal deficit present.       Mental Status: She is alert.              Procedures    Results     Procedure Component Value Units Date/Time    UA with reflex culture [[id number]]  (Abnormal) Collected: 01/02/21 0947    Specimen: Urine, Clean Catch Updated: 01/02/21 1019    Narrative:      The following orders were created for panel order UA with reflex culture.  Procedure                               Abnormality         Status                     ---------                               -----------         ------                     UA Reflex to Culture (Ma...[[id number]]  Abnormal            Final result               UA Microscopic[[id number]]               Normal              Final result                 Please view results for these tests on the individual orders.    UA Microscopic [[id number]]  (Normal) Collected: 01/02/21 0947    Specimen: Urine, Clean Catch Updated: 01/02/21 1019     RBC, Urine 0 TO 4 /HPF      WBC, Urine 0 TO 3 /HPF      Squamous Epithelial None Seen /hpf      Hyaline Cast None Seen /lpf      Bacteria, Urine None Seen /HPF     UA Reflex to Culture (Macroscopic) [[id number]]  (Abnormal) Collected: 01/02/21 0947    Specimen: Urine, Clean Catch Updated: 01/02/21 1005     Color, Urine Yellow     Clarity, Urine Cloudy     Specific Gravity, Urine 1.011     pH, Urine 6.5     Leukocyte Esterase Negative     Comment: Results can be falsely negative due to high specific gravity, some antibiotics, glucose >3 g/dl, or WBC other than neutrophils.        Nitrite, Urine Negative     Protein, Urine Negative     Glucose, Urine Negative mg/dL      Ketones, Urine Negative mg/dL      Urobilinogen, Urine 0.2 EU/dL      Bilirubin, Urine Negative mg/dL      Blood, Urine Negative     Comment: The sensitivity of the occult blood test is equivalent to approximately 4 intact RBC/HPF.       SARS-CoV-2 (COVID-19), PCR Nasopharynx [[id number]]  (Normal) Collected: 01/02/21 0719    Specimen: Nasopharyngeal Swab from Nasopharynx Updated: 01/02/21 8684     Narrative:      The following orders were created for panel order SARS-CoV-2 (COVID-19), PCR Nasopharynx.  Procedure                               Abnormality         Status                     ---------                               -----------         ------                     SARS-CoV-2 (COVID-19), P...[[id number]]  Normal              Final result                 Please view results for these tests on the individual orders.    SARS-CoV-2 (COVID-19), PCR Nasopharynx [[id number]]  (Normal) Collected: 01/02/21 0719    Specimen: Nasopharyngeal Swab from Nasopharynx Updated: 01/02/21 0753     SARS-CoV-2 (COVID-19) Negative     Comment: EUA/IVD       Narrative:      Nursing instructions: Obtain nasopharyngeal swab ONLY.  Send swab in viral transport media.    Troponin I [[id number]]  (Normal) Collected: 01/02/21 0530    Specimen: Blood, Venous Updated: 01/02/21 0558     Troponin I <0.02 ng/mL     Lipase [[id number]]  (Normal) Collected: 01/02/21 0530    Specimen: Blood, Venous Updated: 01/02/21 0558     Lipase 32 U/L     Hepatic function panel [[id number]]  (Normal) Collected: 01/02/21 0530    Specimen: Blood, Venous Updated: 01/02/21 0558     Albumin 4.5 g/dL      Bilirubin, Total 1.0 mg/dL      Bilirubin, Direct 0.2 mg/dL      Alkaline Phosphatase 87 IU/L      AST (SGOT) 40 IU/L      ALT (SGPT) 25 IU/L      Total Protein 7.3 g/dL      Comment: Test performed on plasma which typically contains approximately 0.4 g/dL more protein than serum.       Basic metabolic panel [[id number]]  (Abnormal) Collected: 01/02/21 0530    Specimen: Blood, Venous Updated: 01/02/21 0558     Sodium 126 mEQ/L      Potassium 3.4 mEQ/L      Comment: Results obtained on plasma. Plasma Potassium values may be up to 0.4 mEQ/L less than serum values. The differences may be greater for patients with high platelet or white cell counts.        Chloride 92 mEQ/L      CO2 23 mEQ/L      BUN 15 mg/dL      Creatinine 0.7 mg/dL      Glucose 154 mg/dL       Calcium 9.0 mg/dL      eGFR >60.0 mL/min/1.73m*2      Anion Gap 11 mEQ/L     CBC and differential [[id number]]  (Abnormal) Collected: 01/02/21 0530    Specimen: Blood, Venous Updated: 01/02/21 0535     WBC 15.38 K/uL      RBC 3.46 M/uL      Hemoglobin 10.7 g/dL      Hematocrit 32.6 %      MCV 94.2 fL      MCH 30.9 pg      MCHC 32.8 g/dL      RDW 12.2 %      Platelets 287 K/uL      MPV 9.1 fL      Differential Type Auto     nRBC 0.0 %      Immature Granulocytes 0.6 %      Neutrophils 91.0 %      Lymphocytes 2.8 %      Monocytes 5.3 %      Eosinophils 0.0 %      Basophils 0.3 %      Immature Granulocytes, Absolute 0.09 K/uL      Neutrophils, Absolute 14.01 K/uL      Lymphocytes, Absolute 0.43 K/uL      Monocytes, Absolute 0.81 K/uL      Eosinophils, Absolute 0.00 K/uL      Basophils, Absolute 0.04 K/uL           Imaging Results          ULTRASOUND GALLBLADDER (Final result)  Result time 01/02/21 09:55:07    Final result                 Impression:    IMPRESSION:  1.  Acute cholecystitis.  2.  Tiny stones within the common bile duct.  Limited evaluation of the distal  common bile duct.  3.  Intrahepatic and extrahepatic biliary duct dilation, which could be due to  an unseen obstructing bile duct stone distally or extrinsic compression by the  large calculus fixed within the gallbladder neck and associated gallbladder  inflammation. MRI/MRCP may be helpful to further evaluate.    Preliminary report by: Kitty Urbano MD, Jan 02, 2021 06:32 AM             Narrative:    CLINICAL HISTORY: Abdominal pain and vomiting.    COMMENT:  Ultrasound examination limited to the right upper quadrant was  performed. Color flow doppler images were also obtained.    The liver is top normal in size measuring 16.9 cm sagittally.  A large  non-mobile gallstone is seen fixed within the gallbladder neck.  This measures  4.8 cm in greatest transverse dimension.  The gallbladder is dilated with wall  thickening to 0.5 cm.  There is suggestion  of gallbladder wall edema.  No  pericholecystic fluid is seen.  Positive sonographic Rowe's sign was elicited.    The common bile duct is also dilated measuring up to 0.8 cm.  There is  suggestion of small filling defects within the common bile duct concerning for  choledocholithiasis.  The largest measures 0.3 cm.  There is mild intrahepatic  biliary duct dilation. The distal common bile duct is obscured by overlying  bowel gas.    Survey images of the right kidney reveal it to be normal in length measuring  12.1 cm.  There is normal parenchymal echogenicity and cortical thickness  without hydronephrosis.    Imaged portions of the pancreatic head, body and proximal tail are unremarkable.  The pancreatic duct is normal in caliber measuring 0.1 cm.    Color doppler images were unremarkable.                    ED Interpretation    Patient Name: Kina Jesus   Exam(s): abdominal US   MR#: [id number]       History: abd pain today, worsened after dinner vomited twice    Preliminary Results:     Large nonmobile gallstone in the gallbladder neck. Distended gallbladder. Thickening of the gallbladder wall (6 mm). Pericholecystic fluid. Positive Rowe. The findings are compatible with acute cholecystitis.     Dilated CBD (8 mm). Tiny stones seen within the CBD, proximal to the area of greater dilatation. The distal CBD is obscured by overlying gas. Mild prominence of the pancreatic duct. A distal CBD stone may be present, and further assessment with MRCP can't be obtained.     Question of right renal caliectasis. Otherwise negative.    Interpreted by: Kitty Urbano MD, Jan 02, 2021 06:32 AM                                X-RAY CHEST 1 VIEW (Final result)  Result time 01/02/21 07:53:28    Final result                 Impression:    IMPRESSION:  No active disease.                 Narrative:      CLINICAL HISTORY:  Abdominal pain    COMMENT:    Views: Single frontal portable..    Comparison date: none.    The heart and  mediastinal contours are within normal limits.  The trachea is  midline.  The lungs are clear without discrete infiltrate.  There  are no  pleural effusions.  The osseous structures are intact with postsurgical change.                          ED Interpretation    NAD                              ECG 12 lead   Final Result                  ED Course & MDM     MDM         ED Course as of Jan 02 2006   Sat Jan 02, 2021   0546 WBC(!): 15.38 [EK]   0555 COS, signed out to Janey GARNETT and ED attending pending u/s, reassessment and dispo. They will assume care of the patient at this time.    [EK]   0635 Received sign out from Mayuri SIMON. U/S concerning for acute cholecystitis. D/w surgical resident    [NH]   0716 Surgical team to admit    [NH]      ED Course User Index  [EK] Mayuri Floyd PA C  [NH] Janey Santiago PA C         Clinical Impressions as of Jan 02 2006   Acute cholecystitis   Hyponatremia        Mayuri Floyd PA C  01/02/21 0557       Mayuri Floyd PA C  01/02/21 2007

## 2021-01-03 VITALS
TEMPERATURE: 98.3 F | SYSTOLIC BLOOD PRESSURE: 112 MMHG | BODY MASS INDEX: 19.4 KG/M2 | WEIGHT: 131 LBS | HEART RATE: 73 BPM | RESPIRATION RATE: 18 BRPM | DIASTOLIC BLOOD PRESSURE: 60 MMHG | OXYGEN SATURATION: 97 % | HEIGHT: 69 IN

## 2021-01-03 LAB
ALBUMIN SERPL-MCNC: 3.2 G/DL (ref 3.4–5)
ALP SERPL-CCNC: 70 IU/L (ref 35–126)
ALT SERPL-CCNC: 66 IU/L (ref 11–54)
ANION GAP SERPL CALC-SCNC: 4 MEQ/L (ref 3–15)
AST SERPL-CCNC: 84 IU/L (ref 15–41)
BASOPHILS # BLD: 0.04 K/UL (ref 0.01–0.1)
BASOPHILS NFR BLD: 0.4 %
BILIRUB DIRECT SERPL-MCNC: 0.3 MG/DL
BILIRUB SERPL-MCNC: 1.2 MG/DL (ref 0.3–1.2)
BUN SERPL-MCNC: 15 MG/DL (ref 8–20)
CALCIUM SERPL-MCNC: 8.3 MG/DL (ref 8.9–10.3)
CHLORIDE SERPL-SCNC: 101 MEQ/L (ref 98–109)
CO2 SERPL-SCNC: 26 MEQ/L (ref 22–32)
CREAT SERPL-MCNC: 0.6 MG/DL (ref 0.6–1.1)
DIFFERENTIAL METHOD BLD: ABNORMAL
EOSINOPHIL # BLD: 0.05 K/UL (ref 0.04–0.36)
EOSINOPHIL NFR BLD: 0.5 %
ERYTHROCYTE [DISTWIDTH] IN BLOOD BY AUTOMATED COUNT: 12.8 % (ref 11.7–14.4)
GFR SERPL CREATININE-BSD FRML MDRD: >60 ML/MIN/1.73M*2
GLUCOSE SERPL-MCNC: 102 MG/DL (ref 70–99)
HCT VFR BLDCO AUTO: 26.7 % (ref 35–45)
HGB BLD-MCNC: 8.5 G/DL (ref 11.8–15.7)
IMM GRANULOCYTES # BLD AUTO: 0.06 K/UL (ref 0–0.08)
IMM GRANULOCYTES NFR BLD AUTO: 0.6 %
LYMPHOCYTES # BLD: 0.99 K/UL (ref 1.2–3.5)
LYMPHOCYTES NFR BLD: 9.3 %
MAGNESIUM SERPL-MCNC: 1.9 MG/DL (ref 1.8–2.5)
MCH RBC QN AUTO: 30.4 PG (ref 28–33.2)
MCHC RBC AUTO-ENTMCNC: 31.8 G/DL (ref 32.2–35.5)
MCV RBC AUTO: 95.4 FL (ref 83–98)
MONOCYTES # BLD: 0.69 K/UL (ref 0.28–0.8)
MONOCYTES NFR BLD: 6.5 %
NEUTROPHILS # BLD: 8.82 K/UL (ref 1.7–7)
NEUTS SEG NFR BLD: 82.7 %
NRBC BLD-RTO: 0 %
PDW BLD AUTO: 9.5 FL (ref 9.4–12.3)
PHOSPHATE SERPL-MCNC: 3.1 MG/DL (ref 2.4–4.7)
PLATELET # BLD AUTO: 193 K/UL (ref 150–369)
POTASSIUM SERPL-SCNC: 4.4 MEQ/L (ref 3.6–5.1)
PROT SERPL-MCNC: 5.3 G/DL (ref 6–8.2)
RBC # BLD AUTO: 2.8 M/UL (ref 3.93–5.22)
SODIUM SERPL-SCNC: 131 MEQ/L (ref 136–144)
WBC # BLD AUTO: 10.65 K/UL (ref 3.8–10.5)

## 2021-01-03 PROCEDURE — 83735 ASSAY OF MAGNESIUM: CPT | Performed by: STUDENT IN AN ORGANIZED HEALTH CARE EDUCATION/TRAINING PROGRAM

## 2021-01-03 PROCEDURE — 36415 COLL VENOUS BLD VENIPUNCTURE: CPT | Performed by: STUDENT IN AN ORGANIZED HEALTH CARE EDUCATION/TRAINING PROGRAM

## 2021-01-03 PROCEDURE — 99024 POSTOP FOLLOW-UP VISIT: CPT | Performed by: SURGERY

## 2021-01-03 PROCEDURE — 63600000 HC DRUGS/DETAIL CODE: Performed by: STUDENT IN AN ORGANIZED HEALTH CARE EDUCATION/TRAINING PROGRAM

## 2021-01-03 PROCEDURE — 84100 ASSAY OF PHOSPHORUS: CPT | Performed by: STUDENT IN AN ORGANIZED HEALTH CARE EDUCATION/TRAINING PROGRAM

## 2021-01-03 PROCEDURE — 200200 PR NO CHARGE: Performed by: SURGERY

## 2021-01-03 PROCEDURE — 82248 BILIRUBIN DIRECT: CPT | Performed by: STUDENT IN AN ORGANIZED HEALTH CARE EDUCATION/TRAINING PROGRAM

## 2021-01-03 PROCEDURE — 80053 COMPREHEN METABOLIC PANEL: CPT | Performed by: STUDENT IN AN ORGANIZED HEALTH CARE EDUCATION/TRAINING PROGRAM

## 2021-01-03 PROCEDURE — 85025 COMPLETE CBC W/AUTO DIFF WBC: CPT | Performed by: STUDENT IN AN ORGANIZED HEALTH CARE EDUCATION/TRAINING PROGRAM

## 2021-01-03 PROCEDURE — 63700000 HC SELF-ADMINISTRABLE DRUG: Performed by: STUDENT IN AN ORGANIZED HEALTH CARE EDUCATION/TRAINING PROGRAM

## 2021-01-03 RX ORDER — TRAMADOL HYDROCHLORIDE 50 MG/1
50 TABLET ORAL EVERY 6 HOURS PRN
Qty: 15 TABLET | Refills: 0 | Status: SHIPPED | OUTPATIENT
Start: 2021-01-03 | End: 2021-01-08

## 2021-01-03 RX ORDER — LANOLIN ALCOHOL/MO/W.PET/CERES
200 CREAM (GRAM) TOPICAL ONCE
Status: COMPLETED | OUTPATIENT
Start: 2021-01-03 | End: 2021-01-03

## 2021-01-03 RX ADMIN — Medication 200 MG: at 09:51

## 2021-01-03 RX ADMIN — TRAMADOL HYDROCHLORIDE 50 MG: 50 TABLET, FILM COATED ORAL at 14:09

## 2021-01-03 RX ADMIN — TRAMADOL HYDROCHLORIDE 50 MG: 50 TABLET, FILM COATED ORAL at 09:50

## 2021-01-03 RX ADMIN — TRAMADOL HYDROCHLORIDE 50 MG: 50 TABLET, FILM COATED ORAL at 05:18

## 2021-01-03 RX ADMIN — DOCUSATE SODIUM 100 MG: 100 CAPSULE, LIQUID FILLED ORAL at 09:50

## 2021-01-03 RX ADMIN — LEVOTHYROXINE SODIUM 100 MCG: 100 TABLET ORAL at 05:18

## 2021-01-03 RX ADMIN — KETOROLAC TROMETHAMINE 15 MG: 15 INJECTION, SOLUTION INTRAMUSCULAR; INTRAVENOUS at 05:33

## 2021-01-03 NOTE — PROGRESS NOTES
General Surgery Daily Progress Note    Subjective  Patient doing well post operatively.  Reports RUQ pain which is moderately well controlled with pain meds.  Tolerating diet without N/V.  No flatus or BM yet.  OOB and voiding without issue.    Objective     Vital signs in last 24 hours:  Temp:  [36.1 °C (97 °F)-37.3 °C (99.2 °F)] 36.4 °C (97.5 °F)  Heart Rate:  [70-96] 96  Resp:  [12-69] 18  BP: (101-159)/() 101/52      Intake/Output Summary (Last 24 hours) at 1/3/2021 0901  Last data filed at 1/3/2021 0523  Gross per 24 hour   Intake 850 ml   Output 625 ml   Net 225 ml     Intake/Output this shift:  No intake/output data recorded.    Physical Exam    General appearance: alert, appears stated age and cooperative  Lungs: no increased WOB  Heart: regular rate and rhythm  Abdomen: soft, tender in RUQ, non distended, incisions c/d/i  Extremities: extremities normal, warm and well-perfused; no cyanosis, clubbing, or edema  Neurologic: Grossly normal      Assessment/Plan     75 y.o. female PMH HLD, rectovaginocele, hypothyroidism on synthroid p/w acute onset RUQ abdominal pain s/p 1/2 lap toney with neg IOC.     - regular diet  - PRN pain control  - DVT ppx  - DC home today    Mayuri Shen MD

## 2021-01-03 NOTE — DISCHARGE INSTRUCTIONS
You have had a laparoscopic cholecystectomy with cholangiogram for calculous cholecystitis by Dr. Rashad Luna at the [facility].    It is not unusual to experience discomfort for the next  7 - 10  days. Bloating is common for a week or so. Wear loose fitting clothing.    To control your discomfort the following is recommended:  1. Tramadol, 50mg every 4 hours as needed for severe pain  2. Extra strength Tylenol or Motrin, 1 -2 every 4 hours as needed for mild to moderate pain  3. An ice pack for 20 minutes, 3 to 4 times a day for 3 days    It is important to drink plenty of fluids, especially water, and maintain a diet high in fiber and low in fat. Avoid dairy products for one week. You should avoid alcohol intake for one week.  If constipation should occur, take Milk of Magnesia, 1 ounce daily as needed.    Minor swelling of the incisions is common, as is black and blue discoloration of the skin. In the vast majority of cases, this resolves without difficulty.     Please follow the following activity guidelines:    1. Do not drive until completely comfortable behind the wheel of a car. You need to be able to react quickly in the event of an emergency. Never drive while taking narcotic pain medication.  2. You may go up and down stairs as needed.  3. Walking is encouraged.  Do not stay in bed all day.  4. You may lift up to 15 lbs as tolerated. No lifting more than 15 lbs for 2 weeks from surgery.  5. You may return to work as instructed by Dr. Luna.  6. You should refrain from sexual activity for several days.  7. You may shower in 2 days. Bathing or swimming is allowed after one week.    Bioabsorbable suture was used to close your small incisions. These are placed under the skin and absorbed by your own body over time.  They do not need to be removed.   Keep your incisions clean and dry for two days.  After two days, you may remove the bandaids and shower over the small paper strips that are in place.   These will fall off on their own in time.    Please call Dr. Luna’s office for a follow up appointment in 2 weeks [phone].     If problems arise after discharge, including but not limited to fever, vomiting or increasing redness and/or discharge from the incision, call Dr. Luna’s office immediately.

## 2021-01-04 NOTE — UM PHYSICIAN REVIEW NOTE
Outpatient status is appropriate for this 1MN stay for   Cholecystitis with Lap. Cholecystectomy .

## 2021-01-04 NOTE — UM PHYSICIAN REVIEW NOTE
Post Discharge Review    Outpatient services are most appropriate for this 76 yo F who required one midnight in the hospital for acute cholecystitis requiring a lap choley with cholangiogram (not on IP only list).    Will need second review.    Kia Loving,

## 2021-01-05 LAB
CASE RPRT: NORMAL
CLINICAL INFO: NORMAL
PATH REPORT.FINAL DX SPEC: NORMAL
PATH REPORT.GROSS SPEC: NORMAL

## 2021-01-07 LAB
GRAM STN SPEC: ABNORMAL
GRAM STN SPEC: ABNORMAL
MICROORGANISM SPEC CULT: ABNORMAL

## 2021-01-11 ENCOUNTER — TELEPHONE (OUTPATIENT)
Dept: BARIATRICS | Facility: CLINIC | Age: 76
End: 2021-01-11

## 2021-01-11 NOTE — TELEPHONE ENCOUNTER
Patient had gallbladder surgery with you on 1/3 and is having pain at her 4th incision below her bra line where the camera is inserted. She has a F/U already set up with you but is concerned. She has a complete lack of appetite and doesn't know if it's normal. She's trying to be active and taking stairs frequently.

## 2021-01-18 ENCOUNTER — TELEMEDICINE (OUTPATIENT)
Dept: BARIATRICS | Facility: CLINIC | Age: 76
End: 2021-01-18
Payer: MEDICARE

## 2021-01-18 DIAGNOSIS — K81.0 ACUTE CHOLECYSTITIS: Primary | ICD-10-CM

## 2021-01-18 PROCEDURE — 99024 POSTOP FOLLOW-UP VISIT: CPT | Mod: 95 | Performed by: SURGERY

## 2021-01-18 NOTE — PROGRESS NOTES
Verification of Patient Location:  The patient affirms they are currently located in the following state: [place]    Request for Consent:   Video Encounter   Hello, my name is Rashad Luna MD.  Before we proceed, can you please verify your identification by telling me your full name and date of birth?  Can you tell me who is in the room with you?    You and I are about to have a telemedicine check-in or visit because you have requested it.  This is a live video-conference.  I am a real person, speaking to you in real time.  There is no one else with me on the video-conference.  However, when we use ([employer], [employer], etc) it is important for you to know that the video-conference may not be secure or private.  I am not recording this conversation and I am asking you not to record it.  This telemedicine visit will be billed to your health insurance or you, if you are self-insured.  You understand you will be responsible for any copayments or coinsurances that apply to your telemedicine visit.  Communication platform used for this encounter:  Integrated Zoom via [email or web address] Video Visit     Before starting our telemedicine visit, I am required to get your consent for this virtual check-in or visit by telemedicine. Do you consent?      Patient Response to Request for Consent:  Yes        Patient ID: Kina Jesus                              : 1945  MRN: [id number]                                            Visit Date: 2021  Encounter Provider: Rashad Luna  Referring Provider: No ref. provider found      Kina Jesus is a 75 y.o. female who presents in the office today s/p lap cholecystectomy IOC performed by Rashad Luna MD at [facility] on 2021.    The patient's postoperative course has not been complicated.    Her pathology report showed Gangrenosa calculus cholecystitis..      Past Medical History: She  has a past medical history of Acid reflux and  Hypothyroidism..  Past Surgical History: She  has a past surgical history that includes Rotator cuff repair; Tonsillectomy; Joint replacement; and Appendectomy..  Medication: She has a current medication list which includes the following prescription(s): docusate sodium, levothyroxine, levothyroxine, and tramadol..  Allergies: She is allergic to colesevelam; ezetimibe; niacin; oxycodone; and statins-hmg-coa reductase inhibitors..    Social History:   Social History     Tobacco Use   Smoking Status Former Smoker   • Years: 50.00   Smokeless Tobacco Never Used     Social History     Substance and Sexual Activity   Alcohol Use Yes   • Frequency: Monthly or less       Review of Systems  Review of Systems    Vitals:  Her  vitals were not taken for this visit.   Weight trend:   Wt Readings from Last 5 Encounters:   01/02/21 59.4 kg (131 lb)    body mass index is unknown because there is no height or weight on file.    Physical Exam:  Physical Exam  Kina D   Patient Active Problem List   Diagnosis   • Acute cholecystitis     Her incisions are well healed and without complications.    Assessment/Plan   Assessment /plan    s/p lap cholecystectomy IOC    I have recommended light duty for another 2-4 weeks limiting lifting to <15 lbs.  She will slowly resume her usual activities after this.  She has been advised to resume driving once their reaction time is back to normal.  She will return as needed.    Rashad Luna MD  1/18/2021

## 2021-01-24 DIAGNOSIS — Z23 ENCOUNTER FOR IMMUNIZATION: ICD-10-CM

## 2021-01-27 ENCOUNTER — IMMUNIZATIONS (OUTPATIENT)
Dept: FAMILY MEDICINE CLINIC | Facility: HOSPITAL | Age: 76
End: 2021-01-27

## 2021-01-27 DIAGNOSIS — Z23 ENCOUNTER FOR IMMUNIZATION: Primary | ICD-10-CM

## 2021-01-27 PROCEDURE — 91301 SARS-COV-2 / COVID-19 MRNA VACCINE (MODERNA) 100 MCG: CPT

## 2021-01-27 PROCEDURE — 0011A SARS-COV-2 / COVID-19 MRNA VACCINE (MODERNA) 100 MCG: CPT

## 2021-02-24 ENCOUNTER — IMMUNIZATIONS (OUTPATIENT)
Dept: FAMILY MEDICINE CLINIC | Facility: HOSPITAL | Age: 76
End: 2021-02-24

## 2021-02-24 DIAGNOSIS — Z23 ENCOUNTER FOR IMMUNIZATION: Primary | ICD-10-CM

## 2021-02-24 PROCEDURE — 0012A SARS-COV-2 / COVID-19 MRNA VACCINE (MODERNA) 100 MCG: CPT

## 2021-02-24 PROCEDURE — 91301 SARS-COV-2 / COVID-19 MRNA VACCINE (MODERNA) 100 MCG: CPT

## 2021-09-01 ENCOUNTER — TRANSCRIBE ORDERS (OUTPATIENT)
Dept: SCHEDULING | Age: 76
End: 2021-09-01

## 2021-09-01 DIAGNOSIS — M54.12 RADICULOPATHY, CERVICAL REGION: Primary | ICD-10-CM

## 2021-09-04 ENCOUNTER — HOSPITAL ENCOUNTER (OUTPATIENT)
Dept: RADIOLOGY | Age: 76
Discharge: HOME | End: 2021-09-04
Attending: PHYSICAL MEDICINE & REHABILITATION
Payer: MEDICARE

## 2021-09-04 DIAGNOSIS — M54.12 RADICULOPATHY, CERVICAL REGION: ICD-10-CM

## 2021-12-05 ENCOUNTER — HOSPITAL ENCOUNTER (EMERGENCY)
Facility: HOSPITAL | Age: 76
Discharge: HOME | End: 2021-12-05
Attending: STUDENT IN AN ORGANIZED HEALTH CARE EDUCATION/TRAINING PROGRAM
Payer: MEDICARE

## 2021-12-05 ENCOUNTER — APPOINTMENT (EMERGENCY)
Dept: RADIOLOGY | Facility: HOSPITAL | Age: 76
End: 2021-12-05
Attending: STUDENT IN AN ORGANIZED HEALTH CARE EDUCATION/TRAINING PROGRAM
Payer: MEDICARE

## 2021-12-05 VITALS
HEART RATE: 70 BPM | OXYGEN SATURATION: 100 % | TEMPERATURE: 98 F | SYSTOLIC BLOOD PRESSURE: 137 MMHG | RESPIRATION RATE: 18 BRPM | DIASTOLIC BLOOD PRESSURE: 68 MMHG

## 2021-12-05 DIAGNOSIS — R07.9 CHEST PAIN, UNSPECIFIED TYPE: Primary | ICD-10-CM

## 2021-12-05 LAB
ALBUMIN SERPL-MCNC: 3.8 G/DL (ref 3.4–5)
ALP SERPL-CCNC: 74 IU/L (ref 35–126)
ALT SERPL-CCNC: 25 IU/L (ref 11–54)
ANION GAP SERPL CALC-SCNC: 6 MEQ/L (ref 3–15)
AST SERPL-CCNC: 30 IU/L (ref 15–41)
BASOPHILS # BLD: 0.05 K/UL (ref 0.01–0.1)
BASOPHILS NFR BLD: 0.7 %
BILIRUB DIRECT SERPL-MCNC: 0.1 MG/DL
BILIRUB SERPL-MCNC: 0.8 MG/DL (ref 0.3–1.2)
BUN SERPL-MCNC: 15 MG/DL (ref 8–20)
CALCIUM SERPL-MCNC: 9.3 MG/DL (ref 8.9–10.3)
CHLORIDE SERPL-SCNC: 97 MEQ/L (ref 98–109)
CO2 SERPL-SCNC: 26 MEQ/L (ref 22–32)
CREAT SERPL-MCNC: 0.6 MG/DL (ref 0.6–1.1)
DIFFERENTIAL METHOD BLD: ABNORMAL
EOSINOPHIL # BLD: 0.22 K/UL (ref 0.04–0.36)
EOSINOPHIL NFR BLD: 2.9 %
ERYTHROCYTE [DISTWIDTH] IN BLOOD BY AUTOMATED COUNT: 12.2 % (ref 11.7–14.4)
GFR SERPL CREATININE-BSD FRML MDRD: >60 ML/MIN/1.73M*2
GLUCOSE SERPL-MCNC: 171 MG/DL (ref 70–99)
HCT VFR BLDCO AUTO: 33.2 % (ref 35–45)
HGB BLD-MCNC: 10.8 G/DL (ref 11.8–15.7)
IMM GRANULOCYTES # BLD AUTO: 0.03 K/UL (ref 0–0.08)
IMM GRANULOCYTES NFR BLD AUTO: 0.4 %
LIPASE SERPL-CCNC: 31 U/L (ref 20–51)
LYMPHOCYTES # BLD: 0.96 K/UL (ref 1.2–3.5)
LYMPHOCYTES NFR BLD: 12.8 %
MCH RBC QN AUTO: 30.2 PG (ref 28–33.2)
MCHC RBC AUTO-ENTMCNC: 32.5 G/DL (ref 32.2–35.5)
MCV RBC AUTO: 92.7 FL (ref 83–98)
MONOCYTES # BLD: 0.77 K/UL (ref 0.28–0.8)
MONOCYTES NFR BLD: 10.3 %
NEUTROPHILS # BLD: 5.45 K/UL (ref 1.7–7)
NEUTS SEG NFR BLD: 72.9 %
NRBC BLD-RTO: 0 %
PDW BLD AUTO: 9.7 FL (ref 9.4–12.3)
PLATELET # BLD AUTO: 228 K/UL (ref 150–369)
POTASSIUM SERPL-SCNC: 4 MEQ/L (ref 3.6–5.1)
PROT SERPL-MCNC: 6.5 G/DL (ref 6–8.2)
RBC # BLD AUTO: 3.58 M/UL (ref 3.93–5.22)
SODIUM SERPL-SCNC: 129 MEQ/L (ref 136–144)
TROPONIN I SERPL-MCNC: <0.02 NG/ML
WBC # BLD AUTO: 7.48 K/UL (ref 3.8–10.5)

## 2021-12-05 PROCEDURE — 36415 COLL VENOUS BLD VENIPUNCTURE: CPT | Performed by: STUDENT IN AN ORGANIZED HEALTH CARE EDUCATION/TRAINING PROGRAM

## 2021-12-05 PROCEDURE — 84484 ASSAY OF TROPONIN QUANT: CPT | Performed by: PHYSICIAN ASSISTANT

## 2021-12-05 PROCEDURE — 83690 ASSAY OF LIPASE: CPT | Performed by: STUDENT IN AN ORGANIZED HEALTH CARE EDUCATION/TRAINING PROGRAM

## 2021-12-05 PROCEDURE — 85025 COMPLETE CBC W/AUTO DIFF WBC: CPT | Performed by: PHYSICIAN ASSISTANT

## 2021-12-05 PROCEDURE — 93005 ELECTROCARDIOGRAM TRACING: CPT | Performed by: PHYSICIAN ASSISTANT

## 2021-12-05 PROCEDURE — 82248 BILIRUBIN DIRECT: CPT | Performed by: STUDENT IN AN ORGANIZED HEALTH CARE EDUCATION/TRAINING PROGRAM

## 2021-12-05 PROCEDURE — 99284 EMERGENCY DEPT VISIT MOD MDM: CPT | Mod: 25

## 2021-12-05 PROCEDURE — 71275 CT ANGIOGRAPHY CHEST: CPT | Mod: ME

## 2021-12-05 PROCEDURE — 63600105 HC IODINE BASED CONTRAST: Performed by: PHYSICIAN ASSISTANT

## 2021-12-05 PROCEDURE — 80053 COMPREHEN METABOLIC PANEL: CPT | Performed by: PHYSICIAN ASSISTANT

## 2021-12-05 RX ADMIN — IOHEXOL 120 ML: 350 INJECTION, SOLUTION INTRAVENOUS at 22:07

## 2021-12-05 ASSESSMENT — ENCOUNTER SYMPTOMS
SHORTNESS OF BREATH: 0
COUGH: 0
NAUSEA: 0
VOMITING: 0
CHILLS: 0
FEVER: 0
BACK PAIN: 1
DIZZINESS: 0
LIGHT-HEADEDNESS: 0
ABDOMINAL PAIN: 0

## 2021-12-06 LAB
ATRIAL RATE: 83
P AXIS: 85
PR INTERVAL: 172
QRS DURATION: 100
QT INTERVAL: 366
QTC CALCULATION(BAZETT): 430
R AXIS: 72
T WAVE AXIS: 86
VENTRICULAR RATE: 83

## 2021-12-06 NOTE — ED PROVIDER NOTES
Emergency Medicine Note  HPI   HISTORY OF PRESENT ILLNESS     76 year old female with a history of hypothyroidism presents to the ER with chest pain. She says for the last 2 days shes been having a pain in her left chest into her left back. She says the pain is always there but is worse with inspiration. She denies any recent cough, uri. She says she is started to feel short of breath. She denies any h/o similar symptoms. No dizziness nausea vomiting abdominal pain fevers chills. No leg swelling or cramping. No PMH of cardiac disease or VTE             Patient History   PAST HISTORY     Reviewed from Nursing Triage:       Past Medical History:   Diagnosis Date   • Acid reflux    • Hypothyroidism        Past Surgical History:   Procedure Laterality Date   • APPENDECTOMY     • JOINT REPLACEMENT     • ROTATOR CUFF REPAIR     • TONSILLECTOMY         History reviewed. No pertinent family history.    Social History     Tobacco Use   • Smoking status: Former Smoker     Years: 50.00   • Smokeless tobacco: Never Used   Substance Use Topics   • Alcohol use: Yes   • Drug use: Never         Review of Systems   REVIEW OF SYSTEMS     Review of Systems   Constitutional: Negative for chills and fever.   Respiratory: Negative for cough and shortness of breath.    Cardiovascular: Positive for chest pain. Negative for leg swelling.   Gastrointestinal: Negative for abdominal pain, nausea and vomiting.   Musculoskeletal: Positive for back pain.   Neurological: Negative for dizziness and light-headedness.         VITALS     ED Vitals    Date/Time Temp Pulse Resp BP SpO2 [facility]   12/05/21 2009 -- 84 18 137/68 100 % AC                       Physical Exam   PHYSICAL EXAM     Physical Exam  Constitutional:       General: She is not in acute distress.     Appearance: She is well-developed. She is not ill-appearing, toxic-appearing or diaphoretic.   HENT:      Head: Normocephalic and atraumatic.   Cardiovascular:      Rate and Rhythm: Normal rate  and regular rhythm.   Pulmonary:      Effort: Pulmonary effort is normal.      Breath sounds: No decreased breath sounds, wheezing, rhonchi or rales.   Chest:      Chest wall: No tenderness.   Abdominal:      Palpations: Abdomen is soft.      Tenderness: There is no abdominal tenderness. There is no guarding or rebound.   Musculoskeletal:      Right lower leg: No tenderness. No edema.      Left lower leg: No tenderness. No edema.   Neurological:      Mental Status: She is alert.           PROCEDURES     Procedures     DATA     Results     None          Imaging Results    None         ECG 12 lead   ED Interpretation   NSR 83 normal axis Qtc 430 nonspecific T wave inversion in V2 no ST elevation or depression           Scoring tools                                 ED Course & MDM   MDM / ED COURSE and CLINICAL IMPRESSIONS     [facility]    ED Course as of 12/05/21 2341   Sun Dec 05, 2021   2005 EKG 20:05 - nsr 83 bpm, nl axis, good rwp, no st/t wave changes, qt/qtc 366/430 ms.  [NS]   2037 Discussed with MARIOLA. Agree with plan.  [NS]   2100 Pt seen and evaluated. Comes to ED with two days worth of left sided chest pain worse with movement and breathing. Pt denies trauma or injury. No overlying skin changes or rash. Denies known h/o cardiac disease. EKG reviewed with no acute findings. CBC with mild anemia similar to previous and troponin negative. Secondary to pleuritic component of chest discomfort plan for PE study (denies any risk factors for thromboembolic disease). Added LFT and lipase because pt reports of a similar pain earlier this year that was in same location and ended up being necrotic gallbladder requiring surgery. Pt offered analgesia but declines at this time. Will await remainder of lab and imaging results.  [NS]   2316 CT chest negative.   Pain for a couple of days  EKG nonsichemic  Trop neg.  Will d/c  [AC]      ED Course User Index  [AC] Suzanne Fernandez PA C  [NS] Erwin Nuñez, DO         Clinical  Impressions as of 12/05/21 2341   Chest pain, unspecified type            Suzanne Fernandez PA C  12/05/21 0108

## 2021-12-06 NOTE — ED ATTESTATION NOTE
I saw and evaluated the patient, participated in the management, and agree with the findings in the above note. We discussed the case and the treatment plan.  Exam:  Patient Vitals for the past 72 hrs:   BP Temp Pulse Resp SpO2   12/05/21 2113 -- 36.7 °C (98 °F) -- -- --   12/05/21 2009 137/68 -- 84 18 100 %   Vs reviewed, nad, nontoxic, head at/nc, normal speech, no resp distress, normal skin tone, pt with splinting with deep breaths reporting pain to right lateral chest area, no overlying skin changes or rash, no crepitus, lungs ctab, cardiac rrr without m/r/g.        Erwin Nuñez, DO  12/05/21 2119

## 2024-03-13 NOTE — DISCHARGE SUMMARY
Inpatient Discharge Summary    BRIEF OVERVIEW  Admitting Provider: Rashad Luna MD  Discharge Provider: Rashad Luna MD  Primary Care Physician at Discharge: Aron Grimm [phone]     Admission Date: 1/2/2021     Discharge Date: 1/3/2021    Primary Discharge Diagnosis  Acute Cholecystitis    Secondary Discharge Diagnosis  None    Discharge Disposition  Home   Code Status at Discharge: Full Code    Discharge Medications     Medication List      CHANGE how you take these medications    * traMADoL 50 mg tablet  Commonly known as: ULTRAM  Take 50 mg by mouth every 4 (four) hours as needed. for pain  Dose: 50 mg  What changed: Another medication with the same name was added. Make sure you understand how and when to take each.     * traMADoL 50 mg tablet  Commonly known as: ULTRAM  Take 1 tablet (50 mg total) by mouth every 6 (six) hours as needed for moderate pain for up to 5 days.  Dose: 50 mg  What changed: You were already taking a medication with the same name, and this prescription was added. Make sure you understand how and when to take each.         * This list has 2 medication(s) that are the same as other medications prescribed for you. Read the directions carefully, and ask your doctor or other care provider to review them with you.            CONTINUE taking these medications    docusate sodium 100 mg capsule  Commonly known as: COLACE  Take 100 mg by mouth daily.  Dose: 100 mg     * levothyroxine 100 mcg tablet  Commonly known as: SYNTHROID  Take 100 mcg by mouth every other day. Alternating with 112 mcg  Dose: 100 mcg     * levothyroxine 112 mcg tablet  Commonly known as: SYNTHROID  Take 112 mcg by mouth every other day. Alternating with 100 mcg  Dose: 112 mcg         * This list has 2 medication(s) that are the same as other medications prescribed for you. Read the directions carefully, and ask your doctor or other care provider to review them with you.                Active Issues Requiring  Follow-up  Postop follow up appointment in 2 weeks    Outpatient Follow-Up  Encounter Information     You do not currently have any appointments scheduled.          Referrals and Follow-ups to Schedule     Lifting Restrictions (Specify)      Maximum Weigth to Lift: 10 Pounds          Test Results Pending at Discharge  Pending Labs     Order Current Status    Pathology Tissue Exam In process    Anaerobic Culture / Smear (includes Aerobic Culture) Bile Preliminary result          DETAILS OF HOSPITAL STAY    Presenting Problem/History of Present Illness  Acute cholecystitis [K81.0]  Hyponatremia [E87.1]  Acute cholecystitis [K81.0]  75 y.o. female with PMH HLD, rectovaginocele, hypothyroidism on synthroid p/w 2 days diffuse upper abdominal pain (R>L) initially resolved early yesterday though recurred yesterday more severe in the evening prompting the ED visit. Her symptoms were associated with chills from the pain, 1 episode of nausea and large volume emesis of oatmeal she ate. Of note, she had similar symptoms in Mach 2019 which prompted several work up with GI and [facility] though all have not been revealing and her sxs have not recurred until now. This time, her sxs started after she had avocado. Colonoscopy 3 years ago was clear. She otherwise denies f/cp/sob/cough/covid-19 exposure. She has regular BMs 3x daily with miralax which she has been taking for the past year, initially initiated for constipation.      In the ED, she AFVSS, WBC 15, LFTs wnl. RUQ US performed  Prelim read:  5mm GB wall, mild pericholecystic fluid, + sonographic sawyer's sign.    Operative Procedures Performed  Procedure(s):  CHOLECYSTECTOMY LAPAROSCOPIC WITH INTRAOPERATIVE CHOLANGIOGRAM      Hospital Course  The patient was admitted and underwent an uneventful laparoscopic cholecystectomy on 1/2.  The patient's diet was advanced to clears immediately postoperatively then to a regular diet for dinner which she tolerated without issue.  She was  discharged home on POD1 with good pain control and labs showing a downtrend WBC, normal T/Dbili and slightly elevated LFTs as expected due to intrahepatic GB.  She was prescribed tramadol and instructed to follow up with Dr. Luna in 2 weeks.     DC instructions

## 2025-03-14 ENCOUNTER — APPOINTMENT (EMERGENCY)
Dept: RADIOLOGY | Facility: HOSPITAL | Age: 80
End: 2025-03-14
Attending: STUDENT IN AN ORGANIZED HEALTH CARE EDUCATION/TRAINING PROGRAM
Payer: MEDICARE

## 2025-03-14 ENCOUNTER — HOSPITAL ENCOUNTER (EMERGENCY)
Facility: HOSPITAL | Age: 80
Discharge: HOME | End: 2025-03-14
Attending: STUDENT IN AN ORGANIZED HEALTH CARE EDUCATION/TRAINING PROGRAM | Admitting: STUDENT IN AN ORGANIZED HEALTH CARE EDUCATION/TRAINING PROGRAM
Payer: MEDICARE

## 2025-03-14 VITALS
WEIGHT: 135 LBS | OXYGEN SATURATION: 98 % | TEMPERATURE: 99.3 F | DIASTOLIC BLOOD PRESSURE: 83 MMHG | HEIGHT: 69 IN | BODY MASS INDEX: 19.99 KG/M2 | SYSTOLIC BLOOD PRESSURE: 164 MMHG | RESPIRATION RATE: 16 BRPM | HEART RATE: 88 BPM

## 2025-03-14 DIAGNOSIS — M62.838 MUSCLE SPASM: ICD-10-CM

## 2025-03-14 DIAGNOSIS — S16.1XXA CERVICAL STRAIN, ACUTE, INITIAL ENCOUNTER: Primary | ICD-10-CM

## 2025-03-14 LAB
ALBUMIN SERPL-MCNC: 4.4 G/DL (ref 3.5–5.7)
ALP SERPL-CCNC: 69 IU/L (ref 34–125)
ALT SERPL-CCNC: 17 IU/L (ref 7–52)
ANION GAP SERPL CALC-SCNC: 8 MEQ/L (ref 3–15)
AST SERPL-CCNC: 33 IU/L (ref 13–39)
BASOPHILS # BLD: 0.05 K/UL (ref 0.01–0.1)
BASOPHILS NFR BLD: 0.6 %
BILIRUB SERPL-MCNC: 0.9 MG/DL (ref 0.3–1.2)
BUN SERPL-MCNC: 20 MG/DL (ref 7–25)
CALCIUM SERPL-MCNC: 9.2 MG/DL (ref 8.6–10.3)
CHLORIDE SERPL-SCNC: 96 MEQ/L (ref 98–107)
CO2 SERPL-SCNC: 29 MEQ/L (ref 21–31)
CREAT SERPL-MCNC: 0.7 MG/DL (ref 0.6–1.2)
DIFFERENTIAL METHOD BLD: ABNORMAL
EGFRCR SERPLBLD CKD-EPI 2021: >60 ML/MIN/1.73M*2
EOSINOPHIL # BLD: 0.04 K/UL (ref 0.04–0.36)
EOSINOPHIL NFR BLD: 0.5 %
ERYTHROCYTE [DISTWIDTH] IN BLOOD BY AUTOMATED COUNT: 11.9 % (ref 11.7–14.4)
FLUAV RNA SPEC QL NAA+PROBE: NEGATIVE
FLUBV RNA SPEC QL NAA+PROBE: NEGATIVE
GLUCOSE SERPL-MCNC: 137 MG/DL (ref 70–99)
HCT VFR BLD AUTO: 35.3 % (ref 35–45)
HGB BLD-MCNC: 11.7 G/DL (ref 11.8–15.7)
IMM GRANULOCYTES # BLD AUTO: 0.02 K/UL (ref 0–0.08)
IMM GRANULOCYTES NFR BLD AUTO: 0.2 %
LYMPHOCYTES # BLD: 0.56 K/UL (ref 1.2–3.5)
LYMPHOCYTES NFR BLD: 6.7 %
MCH RBC QN AUTO: 31.5 PG (ref 28–33.2)
MCHC RBC AUTO-ENTMCNC: 33.1 G/DL (ref 32.2–35.5)
MCV RBC AUTO: 94.9 FL (ref 83–98)
MONOCYTES # BLD: 0.99 K/UL (ref 0.28–0.8)
MONOCYTES NFR BLD: 11.9 %
NEUTROPHILS # BLD: 6.64 K/UL (ref 1.7–7)
NEUTS SEG NFR BLD: 80.1 %
NRBC BLD-RTO: 0 %
PLATELET # BLD AUTO: 256 K/UL (ref 150–369)
PMV BLD AUTO: 10.1 FL (ref 9.4–12.3)
POTASSIUM SERPL-SCNC: 4.6 MEQ/L (ref 3.5–5.1)
PROT SERPL-MCNC: 7 G/DL (ref 6–8.2)
RBC # BLD AUTO: 3.72 M/UL (ref 3.93–5.22)
RSV RNA SPEC QL NAA+PROBE: NEGATIVE
S PYO DNA THROAT QL NAA+PROBE: NOT DETECTED
SARS-COV-2 RNA RESP QL NAA+PROBE: NEGATIVE
SODIUM SERPL-SCNC: 133 MEQ/L (ref 136–145)
WBC # BLD AUTO: 8.3 K/UL (ref 3.8–10.5)

## 2025-03-14 PROCEDURE — 85025 COMPLETE CBC W/AUTO DIFF WBC: CPT | Performed by: STUDENT IN AN ORGANIZED HEALTH CARE EDUCATION/TRAINING PROGRAM

## 2025-03-14 PROCEDURE — 87651 STREP A DNA AMP PROBE: CPT | Performed by: STUDENT IN AN ORGANIZED HEALTH CARE EDUCATION/TRAINING PROGRAM

## 2025-03-14 PROCEDURE — 96374 THER/PROPH/DIAG INJ IV PUSH: CPT | Mod: 59

## 2025-03-14 PROCEDURE — 87637 SARSCOV2&INF A&B&RSV AMP PRB: CPT | Performed by: STUDENT IN AN ORGANIZED HEALTH CARE EDUCATION/TRAINING PROGRAM

## 2025-03-14 PROCEDURE — 70491 CT SOFT TISSUE NECK W/DYE: CPT

## 2025-03-14 PROCEDURE — 80053 COMPREHEN METABOLIC PANEL: CPT | Performed by: STUDENT IN AN ORGANIZED HEALTH CARE EDUCATION/TRAINING PROGRAM

## 2025-03-14 PROCEDURE — 36415 COLL VENOUS BLD VENIPUNCTURE: CPT

## 2025-03-14 PROCEDURE — 85025 COMPLETE CBC W/AUTO DIFF WBC: CPT

## 2025-03-14 PROCEDURE — 99284 EMERGENCY DEPT VISIT MOD MDM: CPT | Mod: 25

## 2025-03-14 PROCEDURE — 80053 COMPREHEN METABOLIC PANEL: CPT

## 2025-03-14 PROCEDURE — 3E0333Z INTRODUCTION OF ANTI-INFLAMMATORY INTO PERIPHERAL VEIN, PERCUTANEOUS APPROACH: ICD-10-PCS | Performed by: STUDENT IN AN ORGANIZED HEALTH CARE EDUCATION/TRAINING PROGRAM

## 2025-03-14 PROCEDURE — 63600000 HC DRUGS/DETAIL CODE: Mod: JZ | Performed by: STUDENT IN AN ORGANIZED HEALTH CARE EDUCATION/TRAINING PROGRAM

## 2025-03-14 PROCEDURE — 63700000 HC SELF-ADMINISTRABLE DRUG: Performed by: STUDENT IN AN ORGANIZED HEALTH CARE EDUCATION/TRAINING PROGRAM

## 2025-03-14 PROCEDURE — 63600105 HC IODINE BASED CONTRAST: Performed by: STUDENT IN AN ORGANIZED HEALTH CARE EDUCATION/TRAINING PROGRAM

## 2025-03-14 RX ORDER — LIDOCAINE 560 MG/1
1 PATCH PERCUTANEOUS; TOPICAL; TRANSDERMAL ONCE
Status: DISCONTINUED | OUTPATIENT
Start: 2025-03-14 | End: 2025-03-14 | Stop reason: HOSPADM

## 2025-03-14 RX ORDER — CYCLOBENZAPRINE HCL 10 MG
10 TABLET ORAL 3 TIMES DAILY PRN
Qty: 12 TABLET | Refills: 0 | Status: SHIPPED | OUTPATIENT
Start: 2025-03-14

## 2025-03-14 RX ORDER — KETOROLAC TROMETHAMINE 15 MG/ML
15 INJECTION, SOLUTION INTRAMUSCULAR; INTRAVENOUS ONCE
Status: COMPLETED | OUTPATIENT
Start: 2025-03-14 | End: 2025-03-14

## 2025-03-14 RX ORDER — IOPAMIDOL 755 MG/ML
75 INJECTION, SOLUTION INTRAVASCULAR
Status: COMPLETED | OUTPATIENT
Start: 2025-03-14 | End: 2025-03-14

## 2025-03-14 RX ORDER — CYCLOBENZAPRINE HCL 5 MG
5 TABLET ORAL ONCE
Status: DISCONTINUED | OUTPATIENT
Start: 2025-03-14 | End: 2025-03-14 | Stop reason: HOSPADM

## 2025-03-14 RX ADMIN — KETOROLAC TROMETHAMINE 15 MG: 15 INJECTION, SOLUTION INTRAMUSCULAR; INTRAVENOUS at 20:08

## 2025-03-14 RX ADMIN — LIDOCAINE 4% 1 PATCH: 40 PATCH TOPICAL at 20:08

## 2025-03-14 RX ADMIN — IOPAMIDOL 75 ML: 755 INJECTION, SOLUTION INTRAVENOUS at 18:45

## 2025-03-14 ASSESSMENT — ENCOUNTER SYMPTOMS
NAUSEA: 0
TINGLING: 0
FEVER: 0
SORE THROAT: 0
LEG PAIN: 0
SYNCOPE: 0
SHORTNESS OF BREATH: 0
CONFUSION: 0
DIZZINESS: 0
COUGH: 0
CHILLS: 0
HEADACHES: 0
MYALGIAS: 0
NUMBNESS: 0
LIGHT-HEADEDNESS: 0
VOMITING: 0
JOINT SWELLING: 0
FACIAL SWELLING: 0
NECK STIFFNESS: 1
TROUBLE SWALLOWING: 0
VISUAL CHANGE: 0
BACK PAIN: 0
SPEECH DIFFICULTY: 0
DECREASED CONCENTRATION: 0
ARTHRALGIAS: 0
NECK PAIN: 1
WEAKNESS: 0
SINUS PRESSURE: 1
PARESIS: 0
ABDOMINAL PAIN: 0
BOWEL INCONTINENCE: 0

## 2025-03-14 NOTE — PROGRESS NOTES
"Kina Jesus   [id number]    Your doctor has referred you for a   that requires the injection of an iodinated contrast material into your bloodstream. This iodinated contrast material, sometimes referred to as \"x-ray dye\" allows for better interpretation of the x-ray films or CT images and results in a more accurate interpretation of the examination.     Without the use of iodinated contrast (x-ray dye), the examination may be less informative and result in a suboptimal examination, and possibly a delay in diagnosis and, if needed, treatment.     The iodinated contrast material is given through a small needle or catheter placed into a vein, usually on the inside of the elbow, on the back of hand, or in a vein in the foot or lower leg.    The most common, though still rare, potential reaction to an intravenous contrast injection is an allergic-like reaction. Most allergic-like reactions are mild, though a small subset of people can have more severe reactions. Mild reactions include mild / scattered hives, itching, scratchy throat, sneezing and nasal congestion. More severe reactions include facial swelling, severe difficulty breathing, wheezing and anaphylactic shock. Those with a prior history allergic-like reaction to the same class of contrast media (such as CT contrast or MRI contrast) are at the highest risk for an allergic reaction.     If you believe you had an allergic reaction to contrast in the past, please let our staff know. We can determine if this increases your risk for a future reaction and provide steps to decrease the risk. This may delay your examination, but it decreases the risk of having a new and possibly more severe reaction to the contrast injection.    People with a history of prior allergic reactions to other substances (such as unrelated medications and food) and patients with a history of asthma have slightly increased risk for an allergic reaction from contrast material when " compared with the general population, but do not require to be pretreated prior to a contrast injection.    You should notify the physician, nurse or technologist if you have ever had any of these conditions or if you have any questions.

## 2025-03-14 NOTE — ED ATTESTATION NOTE
Physician Attestation:      I have personally seen and examined the patient, participated in the management, and agree with the findings in the above note except as where stated.       I have personally performed the key components of the encounter and provided a substantive portion of the care and medical decision making.     The Physician Assistant and I discussed  the case, workup, and disposition.          Chief Complaint  Chief Complaint   Patient presents with    Neck Pain          My focused history, examination, assessment, and plan of care is as follows:        History  79-year-old female presenting for evaluation of neck pain  Reports that she initially had infra orbital swelling bilaterally and saw ENT who scoped her and stated everything was normal  Reports over the last 3 days she started having neck pain that started posterior and started radiating anteriorly  Bilateral posterior neck pain with mild radiation to the right side anterior neck which is worse with movement from side-to-side  Denies fevers, chills, sore throat, trouble breathing, trouble swallowing  Denies nausea vomiting  Denies cough or shortness of breath  He does report congestion  ENT did start her on amoxicillin  Denies trauma or injury     Physical  Vital signs reviewed   Awake alert resting comfortably heart regular rate rhythm  Lungs clear to auscultation  No oropharyngeal erythema or exudate  She has bilateral paracervical muscular tenderness and pain with range of motion of the neck  She has mild anterior right-sided neck tenderness without swelling no lymphadenopathy no redness       MDM / Plan  -Patient historian/Independent historians: Patient  -Prior records reviewed: Labs  -Plan: Labs, viral and strep swab as well as CT neck  Seems muscular in nature; however, given radiation to anterior neck will obtain imaging to rule out other acute pathology     -The patient's chief complaint is an acute problem.     *Refer to ED Workup  tab and PA chart for further documentation.               Akash Hernandez MD  03/14/25 7641

## 2025-03-14 NOTE — ED PROVIDER NOTES
Emergency Medicine Note  HPI   HISTORY OF PRESENT ILLNESS     79-year-old female with a PMH of acid reflux and hypothyroidism presents to the ED for evaluation of BL neck pain and subjective anterior swelling. The patient noticed swelling of the right side of her neck two days ago. She had an ENT appointment yesterday, dx with acute sinusitis. Was started on amoxicillin x 10 days. The patient now reports left sided neck swelling starting today. Pain exacerbated by movement. Applied a heating pad which did not help. Has not taken any OTC analgesics. Denies any trauma/injury or falls. No abnormal sleeping positions. Denies a fever, chills, sore throat, difficulty swallowing, difficulty breathing, chest pain, abdominal pain, N/V. No numbness, tingling, weakness, bruising, redness, or wounds. No recent sick contacts. No recent illness. No recent travel.      History provided by:  Patient  Neck Pain  Associated symptoms: no bladder incontinence, no bowel incontinence, no chest pain, no fever, no headaches, no leg pain, no numbness, no paresis, no syncope, no tingling, no visual change and no weakness          Patient History   PAST HISTORY     Reviewed from Nursing Triage:       Past Medical History:   Diagnosis Date    Acid reflux     Hypothyroidism        Past Surgical History   Procedure Laterality Date    Appendectomy      CHOLECYSTECTOMY LAPAROSCOPIC WITH INTRAOPERATIVE CHOLANGIOGRAM N/A 1/2/2021    Performed by Rashad Luna MD at [facility] OR [facility]    Joint replacement      Rotator cuff repair      Tonsillectomy         No family history on file.    Social History     Tobacco Use    Smoking status: Former     Types: Cigarettes    Smokeless tobacco: Never   Substance Use Topics    Alcohol use: Yes    Drug use: Never         Review of Systems   REVIEW OF SYSTEMS     Review of Systems   Constitutional:  Negative for chills and fever.   HENT:  Positive for congestion and sinus pressure. Negative for dental problem,  drooling, ear discharge, ear pain, facial swelling, sore throat and trouble swallowing.    Eyes:  Negative for visual disturbance.   Respiratory:  Negative for cough and shortness of breath.    Cardiovascular:  Negative for chest pain and syncope.   Gastrointestinal:  Negative for abdominal pain, bowel incontinence, nausea and vomiting.   Genitourinary:  Negative for bladder incontinence.   Musculoskeletal:  Positive for neck pain and neck stiffness. Negative for arthralgias, back pain, gait problem, joint swelling and myalgias.   Neurological:  Negative for dizziness, tingling, speech difficulty, weakness, light-headedness, numbness and headaches.   Psychiatric/Behavioral:  Negative for confusion and decreased concentration.          VITALS     ED Vitals      Date/Time Temp Pulse Resp BP SpO2 [facility]   03/14/25 2119 -- 88 16 164/83 98 % BC   03/14/25 1551 37.4 °C (99.3 °F) 91 16 156/74 99 % LT          Pulse Ox %: 99 % (03/14/25 1702)  Pulse Ox Interpretation: Normal (03/14/25 1702)           Physical Exam   PHYSICAL EXAM     Physical Exam  Vitals and nursing note reviewed.   Constitutional:       General: She is not in acute distress.     Appearance: Normal appearance. She is not ill-appearing.   HENT:      Head: Normocephalic and atraumatic.      Jaw: No swelling.      Mouth/Throat:      Mouth: Mucous membranes are moist.      Pharynx: Oropharynx is clear. Uvula midline. No pharyngeal swelling, oropharyngeal exudate, posterior oropharyngeal erythema or uvula swelling.   Eyes:      Extraocular Movements: Extraocular movements intact.      Conjunctiva/sclera: Conjunctivae normal.   Neck:      Trachea: Trachea and phonation normal.   Cardiovascular:      Rate and Rhythm: Normal rate and regular rhythm.   Pulmonary:      Effort: Pulmonary effort is normal. No respiratory distress.      Breath sounds: Normal breath sounds.   Musculoskeletal:      Cervical back: Spasms and tenderness present. No swelling, edema, deformity,  erythema, signs of trauma, lacerations, bony tenderness or crepitus. Pain with movement and muscular tenderness present. No spinous process tenderness.      Thoracic back: No tenderness or bony tenderness. Normal range of motion.      Lumbar back: No tenderness or bony tenderness. Normal range of motion.   Lymphadenopathy:      Cervical: No cervical adenopathy.      Right cervical: No superficial or posterior cervical adenopathy.     Left cervical: No superficial or posterior cervical adenopathy.   Skin:     General: Skin is warm and dry.      Findings: No abrasion, abscess, bruising, ecchymosis, erythema, signs of injury, laceration, rash or wound.   Neurological:      Mental Status: She is alert and oriented to person, place, and time.           PROCEDURES     Procedures     DATA     Results       Procedure Component Value Units Date/Time    SARS-COV-2 (COVID-19)/ FLU A/B, AND RSV, PCR Nasopharynx [[id number]]  (Normal) Collected: 03/14/25 2008    Specimen: Nasopharyngeal Swab from Nasopharynx Updated: 03/14/25 2100     SARS-CoV-2 (COVID-19) Negative     Influenza A Negative     Influenza B Negative     Respiratory Syncytial Virus Negative    Narrative:      Testing performed using real-time PCR for detection of COVID-19. EUA approved validation studies performed on site.     Group A Strep by PCR, Throat Throat Swab [[id number]]  (Normal) Collected: 03/14/25 2008    Specimen: Throat Swab Updated: 03/14/25 2046     Strep A PCR, Throat Not Detected    Comprehensive metabolic panel [[id number]]  (Abnormal) Collected: 03/14/25 1556    Specimen: Blood, Venous Updated: 03/14/25 1644     Sodium 133 mEQ/L      Potassium 4.6 mEQ/L      Comment: Results obtained on plasma. Plasma Potassium values may be up to 0.4 mEQ/L less than serum values. The differences may be greater for patients with high platelet or white cell counts.        Chloride 96 mEQ/L      CO2 29 mEQ/L      BUN 20 mg/dL      Creatinine 0.7 mg/dL      Glucose  137 mg/dL      Calcium 9.2 mg/dL      AST (SGOT) 33 IU/L      ALT (SGPT) 17 IU/L      Alkaline Phosphatase 69 IU/L      Total Protein 7.0 g/dL      Comment: Test performed on plasma which typically contains approximately 0.4 g/dL more protein than serum.        Albumin 4.4 g/dL      Bilirubin, Total 0.9 mg/dL      eGFR >60.0 mL/min/1.73m*2      Comment: Calculation based on the Chronic Kidney Disease Epidemiology Collaboration (CKD-EPI) equation refit without adjustment for race.        Anion Gap 8 mEQ/L     CBC and differential [[id number]]  (Abnormal) Collected: 03/14/25 1556    Specimen: Blood, Venous Updated: 03/14/25 1629     WBC 8.30 K/uL      RBC 3.72 M/uL      Hemoglobin 11.7 g/dL      Hematocrit 35.3 %      MCV 94.9 fL      MCH 31.5 pg      MCHC 33.1 g/dL      RDW 11.9 %      Platelets 256 K/uL      MPV 10.1 fL      Differential Type Auto     nRBC 0.0 %      Immature Granulocytes 0.2 %      Neutrophils 80.1 %      Lymphocytes 6.7 %      Monocytes 11.9 %      Eosinophils 0.5 %      Basophils 0.6 %      Immature Granulocytes, Absolute 0.02 K/uL      Neutrophils, Absolute 6.64 K/uL      Lymphocytes, Absolute 0.56 K/uL      Monocytes, Absolute 0.99 K/uL      Eosinophils, Absolute 0.04 K/uL      Basophils, Absolute 0.05 K/uL             Imaging Results              CT SOFT TISSUE NECK WITH IV CONTRAST (Final result)  Result time 03/14/25 19:05:52      Final result                   Impression:    IMPRESSION:  1.  No fluid collection suspicious for abscess or other focal inflammatory  process seen. Engorged bilateral external jugular venous branches.  2.  Degenerative changes in the cervical spine as discussed above.                     Narrative:    EXAM: CT SOFT TISSUE NECK W IV CONTRAST    CLINICAL HISTORY: Bilateral neck pain and subjective anterior swelling. Noted  swelling of the right neck 2 days ago, ENT appointment yesterday diagnosed with  acute sinusitis, started antibiotics, now reports left-sided neck  swelling.    COMPARISON: None.    TECHNIQUE: Routine enhanced CT study of the neck was performed with images  obtained from the skull base to mid thorax. Images were reviewed in soft tissue  and bone detail.    ADMINISTERED CONTRAST AND DOSE: 75mL of iopamidoL (ISOVUE-370) 370 mg iodine /mL  (76 %) injection 75 mL    COMMENT:    MASS: No gross suspicious mass identified.    FLUID COLLECTION: No drainable fluid collection seen.    LYMPH NODES: Evaluation of the cervical lymph chains demonstrate no pathologic  lymphadenopathy by imaging criteria. No retropharyngeal or mediastinal  adenopathy is identified.    GLANDS: No focal mass of the major salivary glands. No dominant thyroid nodule.    AIRWAY: Airway appears grossly patent.    VASCULATURE: Grossly patent. Multiple engorged external jugular vein branch is  seen in the anterior neck.    VISUALIZED INTRACRANIAL CONTENTS: No acute abnormality seen.    ORBITS: Globes appear symmetric. Retrobulbar fat appears clear.    SINUSES: Sinuses appear clear. Middle ears and mastoid air cells appear clear.    LUNG APICES: Mild biapical pleural parenchymal scarring.    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  Grade 1 anterolisthesis of C5 on C6 and C4 on C5. Multilevel degenerative disc  disease above the level of fusion with disc height loss and endplate marginal  osteophytosis most pronounced at C3-4. Multilevel neural foraminal narrowing  most pronounced bilaterally at C3-4, C4-5 and C5-6.    OTHER: Streak artifact from dental amalgam                                      No orders to display       Scoring tools                                  ED Course & MDM   MDM / ED COURSE / CLINICAL IMPRESSION / DISPO     Medical Decision Making  Problems Addressed:  Cervical strain, acute, initial encounter: acute illness or injury  Muscle spasm: acute illness or injury    Amount and/or Complexity of Data Reviewed  Labs: ordered. Decision-making  details documented in ED Course.  Radiology: ordered. Decision-making details documented in ED Course.    Risk  OTC drugs.  Prescription drug management.        ED Course as of 03/14/25 2329   Fri Mar 14, 2025   1716 Afebrile on arrival and hemodynamically stable [KS]   1716 WBC: 8.30  No leukocytosis [KS]   1717 Hemoglobin(!): 11.7  Similar to prior [KS]   1717 Comprehensive metabolic panel(!)  Similar to prior [KS]   1908 CT SOFT TISSUE NECK WITH IV CONTRAST  IMPRESSION:  1.  No fluid collection suspicious for abscess or other focal inflammatory  process seen. Engorged bilateral external jugular venous branches.  2.  Degenerative changes in the cervical spine as discussed above.   [KS]   1958 Triage will obtain swabs [KS]   2107 Strep A PCR, Throat: Not Detected [KS]   2107 SARS-COV-2 (COVID-19)/ FLU A/B, AND RSV, PCR Nasopharynx  Negative for COVID/Flu/RSV [KS]   2124 Results reviewed with the patient. Rx for flexeril sent to pharmacy. Continue OTC analgesics. Follow up with primary care provider. The patient verbalized understanding and is agreeable with the plan [KS]      ED Course User Index  [KS] Jenna Olivarez PA C     Clinical Impression      Cervical strain, acute, initial encounter   Muscle spasm     _________________       ED Disposition   Discharge                       Jenna Olivarez PA C  03/14/25 1824

## 2025-03-15 NOTE — DISCHARGE INSTRUCTIONS
The imaging of your neck showed no abscess, swelling, or abnormalities of the soft tissue. You tested negative for COVID/Flu/RSV and strep. The pain in your neck is likely muscular. We sent a prescription for flexeril (muscle relaxer) to your pharmacy. Please pick this up and take as prescribed. You can take Tylenol as well. Please schedule a follow up appointment with your primary care provider.     Return to the ED for worsening symptoms, difficulty swallowing, drooling, difficulty breathing, chest pain, abdominal pain, persistent vomiting, or any other new and concerning symptoms.

## 2025-07-03 DIAGNOSIS — M25.561 ACUTE PAIN OF RIGHT KNEE: Primary | ICD-10-CM

## 2025-07-09 NOTE — PROGRESS NOTES
[facility]     Patient ID: Kina Jesus is a 79 y.o. female.  1945    Chief Complaint: Right knee pain    HPI: 79-year-old female presents the office for evaluation of right knee pain.  She states her knee has been bothering her for the past several months with no specific injury or inciting event.  She reports global tenderness about the anterior aspect of her knee that is worse with walking for long periods of time.  She denies instability such as locking or buckling with ambulation.  She additionally is having troubles with stairs.  Treatments tried include rest ice evaluate knee sleeve and NSAIDs providing her with minimal relief.  She states last year states she was able to walk 40 miles in the [place].  She believes she had a steroid injection in this knee prior to the trip which provided her with immense relief in her symptoms.  She denies previous injuries or surgeries to her right knee.  She has not done physical therapy.  She denies other complaints.    Review of Systems:  Review of systems negative except as stated in above HPI.        There were no vitals filed for this visit.  There is no height or weight on file to calculate BMI.    Physical Exam: Bilateral knees examined and compared.  Inspection of the right knee moderate effusion without ecchymosis or erythema.  Tenderness in the medial lateral joint line.  Pain and crepitus with hyperflexion flexion to 90 degrees.  No laxity with varus or valgus stressing in 15 to 30 degrees flexion.  Negative Lachman's.  Calf is soft and nontender.  Neurovascular exam within normal limits    Imaging: X-rays of the right knee reveal end-stage degenerative changes in the medial lateral and patellofemoral compartments.    Assessment/Plan: Right knee arthritis.  She elects proceed with aspiration and cortisone injection.  Under sterile conditions 30 cc of clear yellow fluid was aspirated from the knee.  She was injected with  cortisone.  Recommend a lightweight knee sleeve and Voltaren gel.  She will follow-up in 6 weeks for artificial lubricants.  If she has questions or concerns she will call return to the office.  I advised the patient their diagnosis is that of symptomatic knee arthritis.  I gave them an understanding that arthritis represents rough surfaces on the ends of the bones that meet each other.  This roughness is responsible for pain, mechanical symptoms, and swelling.  I advised that the lining of the knee is known as the synovium and that the synovium can become irritated producing a synovitis, which will also produce swelling in the knee.  I discussed treatment options in great detail inclusive of the following:  (1) simply living with the knee as is, (2) weight loss program, (3) supervised physical therapy and physician-directed exercises to optimize range of motion and strength, (4) assistive devices such as canes or walkers, (5)  braces, (6) over-the-counter anti-inflammatory medication, (7) prescription medication, (8) steroid injections, (9) artificial lubricant injections, (10) orthobiologic injections including PRP, stem cells, and amniotic cells, (11) arthroscopic surgery for debridement, chondroplasty, and synovectomy, (12) cartilage replacement procedures which could include osteochondral drilling, chondral replacement, and allografts and patches, (12) osteotomies, (13) total knee arthroplasty procedures.  All these options were discussed with the risks and limitations and benefits included.         Right Knee Injection:      I advised the patient that any injection has the potential of causing side effects locally from the needle stick and skin irritation as well as the systemic effects from medication that is utilized.  I advised that complications such as infection are incredibly rare but it is not unusual to have a flareup of pain after an injection which will typically quiet down within a couple  days.  That is 1 rationale as to why we do recommend icing following the injection.  The appropriate timeout was taken. We identified and marked the appropriate anatomic landmarks to guide needle placement. The left knee was prepped in the usual sterile fashion and the overlying skin cleaned using isopropyl alcohol   Local anesthesia achieved using Ethyl Chloride spray (Cooling spray). I injected 80 mg of Depo-Medrol and 4 cc of 1 percent lidocaine under sterile conditions into the knee joint and advised the patient that they should ice their knee three times a day for approximately 10 minutes each for about two to three days. The patient tolerated the injection well without complications.  If any untoward effects are noted, they are advised to call my office or any treating physician available.       AURORA Alston C

## 2025-07-10 ENCOUNTER — HOSPITAL ENCOUNTER (OUTPATIENT)
Dept: RADIOLOGY | Facility: HOSPITAL | Age: 80
Discharge: HOME | End: 2025-07-10
Payer: MEDICARE

## 2025-07-10 ENCOUNTER — OFFICE VISIT (OUTPATIENT)
Dept: ORTHOPEDICS | Facility: CLINIC | Age: 80
End: 2025-07-10
Payer: MEDICARE

## 2025-07-10 DIAGNOSIS — M17.11 ARTHRITIS OF RIGHT KNEE: Primary | ICD-10-CM

## 2025-07-10 DIAGNOSIS — M25.561 ACUTE PAIN OF RIGHT KNEE: ICD-10-CM

## 2025-07-10 PROCEDURE — 20610 DRAIN/INJ JOINT/BURSA W/O US: CPT | Mod: RT

## 2025-07-10 PROCEDURE — 73564 X-RAY EXAM KNEE 4 OR MORE: CPT | Mod: RT

## 2025-07-10 PROCEDURE — 99203 OFFICE O/P NEW LOW 30 MIN: CPT | Mod: 25

## 2025-07-10 RX ORDER — METHYLPREDNISOLONE ACETATE 80 MG/ML
80 INJECTION, SUSPENSION INTRA-ARTICULAR; INTRALESIONAL; INTRAMUSCULAR; SOFT TISSUE ONCE
Status: COMPLETED | OUTPATIENT
Start: 2025-07-10 | End: 2025-07-10

## 2025-07-10 RX ADMIN — METHYLPREDNISOLONE ACETATE 80 MG: 80 INJECTION, SUSPENSION INTRA-ARTICULAR; INTRALESIONAL; INTRAMUSCULAR; SOFT TISSUE at 10:01

## 2025-08-21 ENCOUNTER — OFFICE VISIT (OUTPATIENT)
Dept: ORTHOPEDICS | Facility: CLINIC | Age: 80
End: 2025-08-21
Payer: MEDICARE

## 2025-08-21 DIAGNOSIS — M17.11 ARTHRITIS OF RIGHT KNEE: Primary | ICD-10-CM

## (undated) DEVICE — GLOVE SZ 8 PROTEXIS CLASSIC LATEX

## (undated) DEVICE — SOLN IRRIG .9%SOD 1000ML

## (undated) DEVICE — GOWN SURGICAL REINFORCED X-LAR

## (undated) DEVICE — SET CHOLANGIOGRAPHY MIXTER ENDOSCOPIC 4.0FR

## (undated) DEVICE — SPECIMEN TRAP MUCOUS 40CC

## (undated) DEVICE — COVER LIGHTHANDLE

## (undated) DEVICE — SOLN IV 0.9% NSS 1000ML

## (undated) DEVICE — CANISTER SUCTION 3000CC BEMIS

## (undated) DEVICE — TIPS SCISSOR MICROLINE LAP

## (undated) DEVICE — PAD GROUND ELECTROSURGICAL W/CORD

## (undated) DEVICE — ***USE 149534*** FOG-OUT ANTI-FOG MEDC

## (undated) DEVICE — SUTURE MONOCRYL 4-0  Y496G PS-2 I8IN

## (undated) DEVICE — TUBING SMOKE EVAC PENCIL COATED

## (undated) DEVICE — SUTURE VICRYL 3-0 J416H SH UNDYED

## (undated) DEVICE — TUBING STRYKEFLOW SUCT/IRRIG 10IN

## (undated) DEVICE — MASTISOL LIQUID ADHESIVE

## (undated) DEVICE — TUBE SUCTION 1/4INX20FT STERILE

## (undated) DEVICE — APPLICATOR CHLORAPREP 26ML ORANGE TINT

## (undated) DEVICE — BAG DECANTER

## (undated) DEVICE — TROCAR SLEEVE 5MM

## (undated) DEVICE — NEEDLE INSUFFLATION 120MM

## (undated) DEVICE — DRESSING TEGADERM 4X4 3/4

## (undated) DEVICE — SOLN IRRIG STER WATER 1000ML

## (undated) DEVICE — ***USE 57698*** SLEEVE FLOWTRON DVT CALF SINGLE USE

## (undated) DEVICE — BLADE SCALPEL #15

## (undated) DEVICE — SYRINGE DISP LUER-LOK 30 CC

## (undated) DEVICE — DRAPE C-ARM X-RAY EQUIPMENT IMAGE

## (undated) DEVICE — ***USE 56840*** APPLIER ENDOCLIP M/L ER320

## (undated) DEVICE — NEEDLE DISP HYPO 22GX1-1/2IN

## (undated) DEVICE — SYRINGE 10CC CONTROL LL

## (undated) DEVICE — SYRINGE DISP LUER-LOK 20 CC

## (undated) DEVICE — Device

## (undated) DEVICE — SPONGE GAUZE 4X4 4 PLY-2S

## (undated) DEVICE — GLOVE SZ 8.5 LINER PROTEXIS PI BL

## (undated) DEVICE — CORD LAPAROSCOPIC DISP STERILE

## (undated) DEVICE — PACK RFID GENERAL LAPAROSCOPY

## (undated) DEVICE — DRAPE HALF STERILE

## (undated) DEVICE — TUBING INSUFFLATION CO2 STRYKER

## (undated) DEVICE — ***USE 138152*** ENDOPOUCH RETRIEVE 10MM

## (undated) DEVICE — PAD POSITIONING XL

## (undated) DEVICE — ***USE 138535*** SUTURE VICRYL 0 J287G REEL

## (undated) DEVICE — TROCAR 1ST ENTRY 5 X 100MM ADV Z-THREAD

## (undated) DEVICE — EVACUATOR PLUME-AWAY LAP SMOKE PKG